# Patient Record
Sex: FEMALE | Race: WHITE | NOT HISPANIC OR LATINO | Employment: UNEMPLOYED | URBAN - METROPOLITAN AREA
[De-identification: names, ages, dates, MRNs, and addresses within clinical notes are randomized per-mention and may not be internally consistent; named-entity substitution may affect disease eponyms.]

---

## 2020-09-17 ENCOUNTER — HOSPITAL ENCOUNTER (OUTPATIENT)
Dept: URGENT CARE | Facility: CLINIC | Age: 45
Discharge: HOME | End: 2020-09-17
Attending: PHYSICIAN ASSISTANT

## 2020-09-19 ENCOUNTER — HOSPITAL ENCOUNTER (OUTPATIENT)
Dept: URGENT CARE | Facility: CLINIC | Age: 45
Discharge: HOME | End: 2020-09-19
Attending: EMERGENCY MEDICINE

## 2020-10-28 ENCOUNTER — HOSPITAL ENCOUNTER (OUTPATIENT)
Dept: FAMILY MEDICINE CLINIC | Facility: CLINIC | Age: 45
Discharge: HOME | End: 2020-10-28
Attending: NURSE PRACTITIONER

## 2020-10-28 ENCOUNTER — CONVERSION ENCOUNTER (OUTPATIENT)
Dept: FAMILY MEDICINE CLINIC | Facility: CLINIC | Age: 45
End: 2020-10-28

## 2020-10-28 ENCOUNTER — OFFICE VISIT CONVERTED (OUTPATIENT)
Dept: FAMILY MEDICINE CLINIC | Facility: CLINIC | Age: 45
End: 2020-10-28
Attending: NURSE PRACTITIONER

## 2020-10-28 LAB
BASOPHILS # BLD AUTO: 0.01 10*3/UL (ref 0–0.2)
BASOPHILS NFR BLD AUTO: 0.1 % (ref 0–3)
CONV ABS IMM GRAN: 0.02 10*3/UL (ref 0–0.2)
CONV IMMATURE GRAN: 0.3 % (ref 0–1.8)
DEPRECATED RDW RBC AUTO: 43.7 FL (ref 36.4–46.3)
EOSINOPHIL # BLD AUTO: 0.12 10*3/UL (ref 0–0.7)
EOSINOPHIL # BLD AUTO: 1.6 % (ref 0–7)
ERYTHROCYTE [DISTWIDTH] IN BLOOD BY AUTOMATED COUNT: 12.2 % (ref 11.7–14.4)
HCT VFR BLD AUTO: 39.1 % (ref 37–47)
HGB BLD-MCNC: 13.1 G/DL (ref 12–16)
LYMPHOCYTES # BLD AUTO: 2.57 10*3/UL (ref 1–5)
LYMPHOCYTES NFR BLD AUTO: 33.6 % (ref 20–45)
MCH RBC QN AUTO: 32.5 PG (ref 27–31)
MCHC RBC AUTO-ENTMCNC: 33.5 G/DL (ref 33–37)
MCV RBC AUTO: 97 FL (ref 81–99)
MONOCYTES # BLD AUTO: 0.53 10*3/UL (ref 0.2–1.2)
MONOCYTES NFR BLD AUTO: 6.9 % (ref 3–10)
NEUTROPHILS # BLD AUTO: 4.39 10*3/UL (ref 2–8)
NEUTROPHILS NFR BLD AUTO: 57.5 % (ref 30–85)
NRBC CBCN: 0 % (ref 0–0.7)
PLATELET # BLD AUTO: 328 10*3/UL (ref 130–400)
PMV BLD AUTO: 10.9 FL (ref 9.4–12.3)
RBC # BLD AUTO: 4.03 10*6/UL (ref 4.2–5.4)
WBC # BLD AUTO: 7.64 10*3/UL (ref 4.8–10.8)

## 2020-10-29 LAB
ALBUMIN SERPL-MCNC: 4.4 G/DL (ref 3.5–5)
ALBUMIN/GLOB SERPL: 1.5 {RATIO} (ref 1.4–2.6)
ALP SERPL-CCNC: 79 U/L (ref 42–98)
ALT SERPL-CCNC: 17 U/L (ref 10–40)
ANION GAP SERPL CALC-SCNC: 16 MMOL/L (ref 8–19)
AST SERPL-CCNC: 18 U/L (ref 15–50)
BILIRUB SERPL-MCNC: 0.57 MG/DL (ref 0.2–1.3)
BUN SERPL-MCNC: 10 MG/DL (ref 5–25)
BUN/CREAT SERPL: 13 {RATIO} (ref 6–20)
CALCIUM SERPL-MCNC: 10.3 MG/DL (ref 8.7–10.4)
CHLORIDE SERPL-SCNC: 99 MMOL/L (ref 99–111)
CHOLEST SERPL-MCNC: 225 MG/DL (ref 107–200)
CHOLEST/HDLC SERPL: 5.1 {RATIO} (ref 3–6)
CONV CO2: 23 MMOL/L (ref 22–32)
CONV TOTAL PROTEIN: 7.3 G/DL (ref 6.3–8.2)
CREAT UR-MCNC: 0.77 MG/DL (ref 0.5–0.9)
GFR SERPLBLD BASED ON 1.73 SQ M-ARVRAT: >60 ML/MIN/{1.73_M2}
GLOBULIN UR ELPH-MCNC: 2.9 G/DL (ref 2–3.5)
GLUCOSE SERPL-MCNC: 97 MG/DL (ref 65–99)
HDLC SERPL-MCNC: 44 MG/DL (ref 40–60)
LDLC SERPL CALC-MCNC: 108 MG/DL (ref 70–100)
OSMOLALITY SERPL CALC.SUM OF ELEC: 277 MOSM/KG (ref 273–304)
POTASSIUM SERPL-SCNC: 4 MMOL/L (ref 3.5–5.3)
SODIUM SERPL-SCNC: 134 MMOL/L (ref 135–147)
TRIGL SERPL-MCNC: 364 MG/DL (ref 40–150)
TSH SERPL-ACNC: 3.25 M[IU]/L (ref 0.27–4.2)
VLDLC SERPL-MCNC: 73 MG/DL (ref 5–37)

## 2020-11-09 ENCOUNTER — HOSPITAL ENCOUNTER (OUTPATIENT)
Dept: GENERAL RADIOLOGY | Facility: HOSPITAL | Age: 45
Discharge: HOME | End: 2020-11-09
Attending: NURSE PRACTITIONER

## 2020-11-11 ENCOUNTER — OFFICE VISIT CONVERTED (OUTPATIENT)
Dept: FAMILY MEDICINE CLINIC | Facility: CLINIC | Age: 45
End: 2020-11-11
Attending: NURSE PRACTITIONER

## 2020-12-07 ENCOUNTER — HOSPITAL ENCOUNTER (OUTPATIENT)
Dept: MAMMOGRAPHY | Facility: HOSPITAL | Age: 45
Discharge: HOME | End: 2020-12-07
Attending: NURSE PRACTITIONER

## 2021-04-14 ENCOUNTER — OFFICE VISIT CONVERTED (OUTPATIENT)
Dept: FAMILY MEDICINE CLINIC | Facility: CLINIC | Age: 46
End: 2021-04-14
Attending: NURSE PRACTITIONER

## 2021-04-28 ENCOUNTER — OFFICE VISIT CONVERTED (OUTPATIENT)
Dept: SURGERY | Facility: CLINIC | Age: 46
End: 2021-04-28
Attending: SURGERY

## 2021-04-28 ENCOUNTER — CONVERSION ENCOUNTER (OUTPATIENT)
Dept: SURGERY | Facility: CLINIC | Age: 46
End: 2021-04-28

## 2021-05-10 NOTE — H&P
"   History and Physical      Patient Name: Ada Schuster   Patient ID: [id number]   Sex: Female   YOB: 1975    Primary Care Provider: Annette BERMAN   Referring Provider: [facility]    Visit Date: October 28, 2020    Provider: DOROTHY Mills   Location: [facility]   Location Address: [address] [place] [address], [address] [place] [address]  [id number]   Location Phone: [phone]          Chief Complaint  · new patient  · sores on right breast with pus      History Of Present Illness  Ada Schuster is a 45 year old /White female who presents for evaluation and treatment of:      She is here today as a new patient to establish care.    She is here today for trapezius injury.  She states that on 9/19/2020 she felt a \"crack\" on her posterior left shoulder while she was stretching.  She has been having pain ever since.  She did go to urgent care and then to the emergency room the next day in September.  She did have an x-ray done on her left shoulder which was negative.  She was given Toradol and a steroid injection in the emergency room.  She was also given Naprosyn and Flexeril which helped some.  She was given lidocaine patches when she went to [facility] but states they did not help.  She was told she could be referred to Ortho but when Ortho was called she was told they did not take care of trapezius injury and that she would need to go to the neurosurgeon.  She is not wanting a referral to the neurosurgeon at this time.  She does complain of some neck pain and she states the pain does radiate down her left arm.    She would also like to discuss a possible breast infection.  She is complaining of sores on her right breast that fill up with pus Intermittently for the past year.  She states she just cleans it with soap and water.  She has not had a mammogram since 2011.  Denies any infection at this time.    Obesity: She has a history had a lap band surgery in " .  She has previous history of gestational diabetes, hypertension, hyperlipidemia prior to her weight loss surgery.       Past Medical History  Disease Name Date Onset Notes   Allergies --  --    Anemia --  --    Anxiety --  --    Cataract --  --    Depression --  --    Diabetes --  --    Gall stones --  --    Hyperlipidemia --  --    Hypertension --  --    Kidney stones --  --    Migraines --  --          Past Surgical History  Procedure Name Date Notes   Appendectomy  --     section --  --    Cholecystectomy  --    EAR Surgery --  - inner ear reconstruction   Ear Tubes --  --    Lap Band Surgery  --          Medication List  Name Date Started Instructions   acetaminophen 500 mg oral tablet  take 2 tablets (1,000 mg) by oral route every 6 hours as needed   ibuprofen 400 mg oral tablet  take 1 tablet (400 mg) by oral route 3 times per day with food   melatonin 10 mg oral tablet  take 1 tablet by oral route daily         Allergy List  Allergen Name Date Reaction Notes   NO KNOWN DRUG ALLERGIES --  --  --          Family Medical History  Disease Name Relative/Age Notes   Heart Disease  grandparent   Diabetes, unspecified type Father/  Grandmother (paternal)/   Father; Grandmother (paternal); Aunt (paternal); Uncle (paternal)         Social History  Finding Status Start/Stop Quantity Notes   Alcohol Current some day 0/0 --  drinks rarely   Caffeine Current some day 0/0 --  drinks occasionally; tea; 1-2 times per day   Second hand smoke exposure Never 0/0 --  no   Tobacco Current every day 18/0 1/2 PPD current some days smoker; started smoking at age 16; smoked 5 cigarette(s) per day         Review of Systems  · Constitutional  o Denies  o : fever, fatigue, weight loss, weight gain  · Breasts  o Admits  o : abnormal changes in breast size  o Denies  o : lumps, tenderness  · Cardiovascular  o Denies  o : lower extremity edema, claudication, chest pressure,  "palpitations  · Respiratory  o Denies  o : shortness of breath, wheezing, cough, hemoptysis, dyspnea on exertion  · Gastrointestinal  o Denies  o : nausea, vomiting, diarrhea, constipation, abdominal pain  · Genitourinary  o Denies  o : urgency, frequency  · Integument  o Admits  o : rash, itching, new skin lesions  · Neurologic  o Admits  o : difficulty concentrating  o Denies  o : altered mental status, tingling or numbness  · Musculoskeletal  o Admits  o : muscle pain, neck pain, shoulder pain  o Denies  o : limitation of motion  · Psychiatric  o Admits  o : difficulty sleeping  o Denies  o : suicidal ideation, homicidal ideation      Vitals  Date Time BP Position Site L\R Cuff Size HR RR TEMP (F) WT  HT  BMI kg/m2 BSA m2 O2 Sat FR L/min FiO2        10/28/2020 02:02 /70 Sitting    90 - R  97.1 262lbs 8oz 5'  4\" 45.06 2.32 94 %            Physical Examination  · Constitutional  o Appearance  o : well-nourished, in no acute distress  · Neck  o Inspection/Palpation  o : normal appearance, no masses or tenderness, trachea midline  o Range of Motion  o : cervical range of motion within normal limits  o Thyroid  o : gland size normal, nontender, no nodules or masses present on palpation  · Respiratory  o Respiratory Effort  o : breathing unlabored  o Inspection of Chest  o : normal appearance  o Auscultation of Lungs  o : normal breath sounds throughout  · Cardiovascular  o Heart  o :   § Auscultation of Heart  § : regular rate and rhythm, no murmurs, gallops or rubs  o Peripheral Vascular System  o :   § Extremities  § : no edema  · Breasts  o Inspection of Breasts  o : developmental state normal for age, breasts symmetrical, skin lesion present-right 8 o'clock, no discharge present, no deformities present  o Palpation of Breasts, Axillae  o : no masses present on palpation, no breast tenderness  · Gastrointestinal  o Abdominal Examination  o : abdomen nontender to palpation, tone normal without rigidity or " guarding, no masses present, normal bowel sounds  · Lymphatic  o Neck  o : no lymphadenopathy present  o Axilla  o : no lymphadenopathy present  · Skin and Subcutaneous Tissue  o General Inspection  o : maculopapular eruption present under right breast  · Psychiatric  o Judgement and Insight  o : judgment and insight intact  o Mood and Affect  o : mood normal, affect appropriate  · Cervical Spine  o Inspection  o : no redness, no swelling, no atrophy, no deformity, no mass  o Palpation  o : no tenderness  · Left Shoulder  o Inspection  o : no redness, no scarring, no swelling, no atrophy, no abnormalities, no ecchymosis  o Palpation  o : tenderness to palpation trapezius muscle with muscle spasm          Assessment  · Visit for screening mammogram     V76.12/Z12.31  · Cervical pain (neck)     723.1/M54.2  we will get C-spine xray  · Obesity     278.00/E66.9  · Trapezius muscle strain, left, initial encounter     840.8/S46.812A  will restart Naprosyn and Flexeril. Recommended therapeutic massage.   · Skin lesion of breast, Right     611.9/N64.9  we will get diagnostic mammogram with US if needed of right breast due to non-healing sore  · History of hyperlipidemia     V12.29/Z86.39  · Radiculopathy of arm     723.4/M54.10  · Cutaneous candidiasis     112.3/B37.2  start nystatin powder 2x/day for 14 days      Plan  · Orders  o Screening Mammography; Bilateral 3D (30799, 65371, ) - V76.12/Z12.31 - 10/28/2020  o Cervical Spine Complete [facility] (63745) - 723.1/M54.2, 723.4/M54.10 - 10/28/2020  o CBC with Auto Diff [facility] (86445) - 611.9/N64.9, 278.00/E66.9, V12.29/Z86.39 - 10/28/2020  o CMP [facility] (05982) - 611.9/N64.9, 278.00/E66.9, V12.29/Z86.39 - 10/28/2020  o Lipid Panel [facility] (80654) - 278.00/E66.9, V12.29/Z86.39 - 10/28/2020  o TSH [facility] (27161) - 278.00/E66.9, V12.29/Z86.39 - 10/28/2020  o ACO-39: Current medications updated and reviewed (, 1159F) - - 10/28/2020  o ACO-18: Positive screen for clinical depression using  a standardized tool and a follow-up plan documented () - - 10/28/2020   9 pts.   PHQ-9 score 9, no suicidal ideation. We did not have time to discuss and will discuss on follow-up.  o ACO-14: Influenza immunization was not administered for reasons documented [facility] () - - 10/28/2020   patient declines  o Diagnostic Mammogram 3D breast unilateral RIGHT (w/US if needed) [facility] (, -YL) - 611.9/N64.9 - 10/28/2020  · Medications  o Naprosyn 500 mg oral tablet   SIG: take 1 tablet (500 mg) by oral route every 12 hours with food for 30 days   DISP: (60) Tablet with 0 refills  Prescribed on 10/28/2020     o cyclobenzaprine 10 mg oral tablet   SIG: take 1 tablet (10 mg) by oral route 3 times per day as needed for pain (Drowsiness warning)   DISP: (90) Tablet with 0 refills  Prescribed on 10/28/2020     o nystatin 100,000 unit/gram topical powder   SIG: apply to the affected area(s) by topical route 2 times per day for 14 days   DISP: (1) Bottle with 0 refills  Prescribed on 10/28/2020     o Medications have been Reconciled  o Transition of Care or Provider Policy  · Instructions  o Patient was educated/instructed on their diagnosis, treatment and medications prior to discharge from the clinic today.  o Patient instructed to seek medical attention urgently for new or worsening symptoms.  o Call the office with any concerns or questions.  · Disposition  o Return to clinic in 2 weeks            Electronically Signed by: DOROTHY Mills -Author on October 28, 2020 04:47:02 PM

## 2021-05-11 NOTE — H&P
History and Physical      Patient Name: Ada Schuster   Patient ID: [id number]   Sex: Female   YOB: 1975    Primary Care Provider: January DE LA CRUZ   Referring Provider: January DE LA CRUZ    Visit Date: 2021    Provider: Herber Huggins MD   Location: [facility]   Location Address: [address] [place] [address]  [id number]   Location Phone: [phone]          Chief Complaint  · Outpatient History & Physical / Surgical Orders  · Diverticulitis      History Of Present Illness  Ada Schuster is a 45 year old /White female who presents to the office today as a consult from January DE LA CRUZ.      Previously seen as an inpatient consult 2021 with sigmoid diverticulitis with CT abdomen and pelvis demonstrating 2 segments of acute diverticulitis 1 at the splenic flexure and one at the descending sigmoid junction with the segment at the descending sigmoid junction suggesting subtle microperforation, no abscess.  Admitted and started on antibiotics.  Improved and sent home on Cipro and Flagyl.    She comes in for follow-up.  No changes in health or medication.  She is trying to quit smoking.  She finished her antibiotics.  No fever.  No pain.  No blood in her stool.  No previous colonoscopy.  No history of diverticulitis in the past.  No family history of colorectal cancer.       Past Medical History  Allergies; Anemia; Anxiety; Cataract; Cervical pain (neck); Depression; Diabetes; Gall stones; History of hyperlipidemia; Hyperlipidemia; Hypertension; Kidney Stones; Migraines; Radiculopathy of arm; Skin lesion of breast, Right; Trapezius muscle strain         Past Surgical History  Appendectomy;  section; Cholecystectomy; EAR Surgery; Ear Tubes; Lap Band Surgery         Medication List  acetaminophen 500 mg oral tablet; ciprofloxacin HCl 500 mg oral tablet; cyclobenzaprine 10 mg oral tablet; Diflucan 150 mg oral tablet;  Flagyl 500 mg oral tablet; ibuprofen 400 mg oral tablet; melatonin 10 mg oral tablet; nystatin 100,000 unit/gram topical powder; Suprep Bowel Prep Kit 17.5-3.13-1.6 gram oral recon soln; Wellbutrin  mg oral tablet sustained-release 12 hr         Allergy List  NO KNOWN DRUG ALLERGIES       Allergies Reconciled  Family Medical History  Heart Disease; Diabetes, unspecified type         Social History  Alcohol (Current some day); Caffeine (Current some day); Second hand smoke exposure (Never); Tobacco (Current every day)         Immunizations  Name Date Admin   Influenza Refused         Review of Systems  · Constitutional  o Denies  o : chills, excessive sweating, fatigue, fever, sycope/passing out, weight gain, weight loss  · Eyes  o Denies  o : changes in vision, blurry vision, double vision  · HENT  o Denies  o : loss of hearing, ringing in the ears, ear aches, sore throat, nasal congestion, sinus pain, nose bleeds, seasonal allergies  · Cardiovascular  o Denies  o : blood clots, swollen legs, anemia, easy burising or bleeding, transfusions  · Respiratory  o Denies  o : shortness of breath, dry cough, productive cough, pneumonia, COPD  · Gastrointestinal  o Denies  o : difficulty swallowing, reflux  · Genitourinary  o Denies  o : incontinence  · Neurologic  o Denies  o : headache, seizure, stroke, tremor, loss of balance, falls, dizziness/vertigo, difficulty with sleep, numbness/tingling/paresthesia , difficulty with coordination, difficulty with dexterity, weakness  · Musculoskeletal  o Denies  o : neck stiffness/pain, swollen lymph nodes, muscle aches, joint pain, weakness, spasms, sciatica, pain radiating in arm, pain radiating in leg, low back pain  · Endocrine  o Denies  o : diabetes, thyroid disorder  · Psychiatric  o Denies  o : anxiety, depression      Vitals  Date Time BP Position Site L\R Cuff Size HR RR TEMP (F) WT  HT  BMI kg/m2 BSA m2 O2 Sat FR L/min FiO2        04/28/2021 01:01 PM       14   "258lbs 0oz 5'  4\" 44.29 2.3             Physical Examination  · Constitutional  o Appearance  o : healthy appearing, alert and in no acute distress, reliable historian  · Head and Face  o Head  o :   § Inspection  § : no visable deformities or lesions  · Eyes  o Conjunctivae  o : clear  o Sclerae  o : clear  · Neck  o Inspection/Palpation  o : normal appearance, no masses, trachea midline  · Respiratory  o Respiratory Effort  o : breathing unlabored, respiratory effort appears normal  o Inspection of Chest  o : normal appearance, no retractions  · Cardiovascular  o Heart  o : regular rate and rhythm  · Gastrointestinal  o Abdominal Examination  o :   § Abdomen  § : soft, nondistended, positive bowel sounds, no hepatosplenomegaly, soft, nontender, no mass  · Skin and Subcutaneous Tissue  o General Inspection  o : no visible concerning rashes or lesions present  · Neurologic  o Cranial Nerves  o : no obvious motor deficits  o Sensation  o : no obvious sensory deficits  o Gait and Station  o :   § Gait Screening  § : normal gait, able to stand without diffculty  o Cerebellar Function  o : no obvious abnormalities  · Psychiatric  o Judgement and Insight  o : judgment and insight intact  o Mood and Affect  o : mood normal, affect appropriate          Assessment  · Pre-Surgical Orders     V72.84  · Diverticulitis     562.11/K57.92      Plan  · Orders  o Colonoscopy (34927) - V72.84 - 05/20/2021  o Tobacco cessation counseling completed (4004F) - - 04/28/2021  · Medications  o Medications have been Reconciled  o Transition of Care or Provider Policy  · Instructions  o PLAN: Colonoscopy  o Outpatient  o Consent for surgery: Given these options, the patient has verbally expressed an understanding of the risks of surgery and finds these risks acceptable. We will proceed with surgery as soon as possible.  o The indications, options, risks, benefits, and expected outcomes of the planned procedure were discussed with the patient " and the patient agrees to proceed.   o IV: LR@ 30 ml/hr  o Anesthesia: MAC  o PLEASE SIGN PERMIT FOR: Colonscopy with possible biopsy and possible polypectomy  o Beta HCG urine to be done the morning of surgery.  o Electronically Identified Patient Education Materials Provided Electronically     I had a discussion with the patient.  I recommend a colonoscopy in 2 weeks to evaluate her colon and help rule out a cancer.  Benefits and alternatives discussed.  Risk of procedure including bleeding and perforation discussed.  All questions answered.  She agrees with the plan.  Smoking cessation counseling performed.  I encouraged her to increase the fiber in her diet.  Based on the colonoscopy, further recommendations to follow regarding possible elective resection.  All questions answered.  She agrees with the plan.  Thank you for the consult.             Electronically Signed by: Herber Huggins MD -Author on April 28, 2021 02:27:23 PM

## 2021-05-13 NOTE — PROGRESS NOTES
Progress Note      Patient Name: Ada Schuster   Patient ID: [id number]   Sex: Female   YOB: 1975    Primary Care Provider: January DE LA CRUZ   Referring Provider: [facility]    Visit Date: 2020    Provider: DOROTHY Mills   Location: [facility]   Location Address: [address] [place] [address], [address] [place] [address]  [id number]   Location Phone: [phone]          Chief Complaint  · 2-week follow-up      History Of Present Illness  Ada Schuster is a 45 year old /White female who presents for evaluation and treatment of:      She is here for 2-week follow-up on trapezius muscle strain on the left with neck pain and left arm radiculopathy.  She was given Naprosyn 500 mg twice daily and cyclobenzaprine 10 mg 3 times daily as needed.  Cervical spine x-ray showed mild degenerative changes, most pronounced at C5-C6 and C6-C7, no foraminal stenosis.  She states that her pain is improving and more tolerable now.  She declines to do physical therapy at this time.    She is scheduled for her mammogram.  She is due for Pap smear.    She is a smoker, smokes a half a pack per day.  She is not interested in quitting at this time.       Past Medical History  Disease Name Date Onset Notes   Allergies --  --    Anemia --  --    Anxiety --  --    Cataract --  --    Cervical pain (neck) 10/28/2020 --    Depression --  --    Diabetes --  --    Gall stones --  --    History of hyperlipidemia 10/28/2020 --    Hyperlipidemia --  --    Hypertension --  --    Kidney stones --  --    Migraines --  --    Radiculopathy of arm 10/28/2020 --    Skin lesion of breast, Right 10/28/2020 --          Past Surgical History  Procedure Name Date Notes   Appendectomy  --     section --  --    Cholecystectomy  --    EAR Surgery --  - inner ear reconstruction   Ear Tubes --  --    Lap Band Surgery  --          Medication List  Name Date Started  Instructions   acetaminophen 500 mg oral tablet  take 2 tablets (1,000 mg) by oral route every 6 hours as needed   cyclobenzaprine 10 mg oral tablet 10/28/2020 take 1 tablet (10 mg) by oral route 3 times per day as needed for pain (Drowsiness warning)   ibuprofen 400 mg oral tablet  take 1 tablet (400 mg) by oral route 3 times per day with food   melatonin 10 mg oral tablet  take 1 tablet by oral route daily   Naprosyn 500 mg oral tablet 10/28/2020 take 1 tablet (500 mg) by oral route every 12 hours with food for 30 days   nystatin 100,000 unit/gram topical powder 10/28/2020 apply to the affected area(s) by topical route 2 times per day for 14 days         Allergy List  Allergen Name Date Reaction Notes   NO KNOWN DRUG ALLERGIES --  --  --          Family Medical History  Disease Name Relative/Age Notes   Heart Disease  grandparent   Diabetes, unspecified type Father/  Grandmother (paternal)/   Father; Grandmother (paternal); Aunt (paternal); Uncle (paternal)         Social History  Finding Status Start/Stop Quantity Notes   Alcohol Current some day 0/0 --  drinks rarely   Caffeine Current some day 0/0 --  drinks occasionally; tea; 1-2 times per day   Second hand smoke exposure Never 0/0 --  no   Tobacco Current every day 18/0 1/2 PPD current some days smoker; started smoking at age 16; smoked 5 cigarette(s) per day         Immunizations  NameDate Admin Mfg Trade Name Lot Number Route Inj VIS Given VIS Publication   InfluenzaRefused 10/28/2020 NE Not Entered  NE NE     Comments:          Review of Systems  · Constitutional  o Denies  o : fever, fatigue, weight loss, weight gain  · Cardiovascular  o Denies  o : lower extremity edema, claudication, chest pressure, palpitations  · Respiratory  o Denies  o : shortness of breath, wheezing, cough, hemoptysis, dyspnea on exertion  · Gastrointestinal  o Denies  o : nausea, vomiting, diarrhea, constipation, abdominal pain  · Genitourinary  o Denies  o : urgency,  "frequency  · Integument  o Denies  o : rash, itching  · Neurologic  o Admits  o : tingling or numbness  o Denies  o : altered mental status  · Musculoskeletal  o Admits  o : shoulder pain  o Denies  o : neck pain      Vitals  Date Time BP Position Site L\R Cuff Size HR RR TEMP (F) WT  HT  BMI kg/m2 BSA m2 O2 Sat FR L/min FiO2 HC       11/11/2020 12:57 /72 Sitting    96 - R  98 264lbs 0oz 5'  4\" 45.32 2.33 95 %            Physical Examination  · Constitutional  o Appearance  o : no acute distress, well-nourished  · Head and Face  o Head  o :   § Inspection  § : atraumatic, normocephalic  · Neck  o Thyroid  o : gland size normal, nontender, no nodules or masses present on palpation, symmetric  · Respiratory  o Respiratory Effort  o : breathing comfortably, symmetric chest rise  o Auscultation of Lungs  o : clear to asculatation bilaterally, no wheezes, rales, or rhonchii  · Cardiovascular  o Heart  o :   § Auscultation of Heart  § : regular rate and rhythm, no murmurs, rubs, or gallops  o Peripheral Vascular System  o :   § Extremities  § : no edema  · Lymphatic  o Neck  o : no lymphadenopathy present  · Neurologic  o Mental Status Examination  o :   § Orientation  § : grossly oriented to person, place and time  o Gait and Station  o :   § Gait Screening  § : normal gait  · Psychiatric  o General  o : normal mood and affect  · Cervical Spine  o Inspection  o : no redness, no swelling, no atrophy, no deformity, no mass  o Palpation  o : no tenderness  · Left Shoulder  o Inspection  o : no redness, no scarring, no swelling, no atrophy, no abnormalities, no ecchymosis  o Palpation  o : Posterior shoulder trapezius muscle tender on palpation          Assessment  · Radiculopathy of arm     723.4/M54.10  · Trapezius muscle strain     840.8/S46.819A  Pain is improving, continue meds as listed.      Plan  · Orders  o ACO-39: Current medications updated and reviewed (, 2185U) - - " 11/11/2020  · Medications  o Medications have been Reconciled  o Transition of Care or Provider Policy  · Instructions  o Patient was educated/instructed on their diagnosis, treatment and medications prior to discharge from the clinic today.  o Patient counseled to stop smoking.  o Patient instructed to seek medical attention urgently for new or worsening symptoms.  o Call the office with any concerns or questions.  · Disposition  o Schedule Pap smear            Electronically Signed by: DOROTHY Mills -Author on November 11, 2020 01:09:47 PM

## 2021-05-14 VITALS
TEMPERATURE: 97.1 F | DIASTOLIC BLOOD PRESSURE: 70 MMHG | HEIGHT: 64 IN | OXYGEN SATURATION: 94 % | WEIGHT: 262.5 LBS | SYSTOLIC BLOOD PRESSURE: 114 MMHG | BODY MASS INDEX: 44.82 KG/M2 | HEART RATE: 90 BPM

## 2021-05-14 VITALS
BODY MASS INDEX: 44.24 KG/M2 | TEMPERATURE: 97.6 F | OXYGEN SATURATION: 96 % | HEIGHT: 64 IN | SYSTOLIC BLOOD PRESSURE: 112 MMHG | DIASTOLIC BLOOD PRESSURE: 82 MMHG | WEIGHT: 259.12 LBS | HEART RATE: 94 BPM

## 2021-05-14 VITALS
SYSTOLIC BLOOD PRESSURE: 110 MMHG | HEIGHT: 64 IN | DIASTOLIC BLOOD PRESSURE: 72 MMHG | TEMPERATURE: 98 F | OXYGEN SATURATION: 95 % | HEART RATE: 96 BPM | WEIGHT: 264 LBS | BODY MASS INDEX: 45.07 KG/M2

## 2021-05-14 VITALS — HEIGHT: 64 IN | RESPIRATION RATE: 14 BRPM | WEIGHT: 258 LBS | BODY MASS INDEX: 44.05 KG/M2

## 2021-05-14 NOTE — PROGRESS NOTES
"   Progress Note      Patient Name: Ada Schuster   Patient ID: [id number]   Sex: Female   YOB: 1975    Primary Care Provider: January DE LA CRUZ   Referring Provider: January DE LA CRUZ    Visit Date: 2021    Provider: DOROTHY Mills   Location: [facility]   Location Address: [address] [place] [address], [address] [place] [address]  [id number]   Location Phone: [phone]          Chief Complaint  · inpatient follow up for diverticulitis      History Of Present Illness  Ada Schuster is a 45 year old /White female who presents for evaluation and treatment of:      She is here for follow-up from the hospital.  She was admitted on 2021 for acute diverticulitis.  She states she is doing much better now and denies any pain.  She is still taking Cipro and Flagyl as prescribed.  She does have a follow-up with gastroenterologist Dr. Huggins.  She states that he is planning to do a colonoscopy.    She does think she has a vaginal yeast infection due to the antibiotics.  She is starting to have vaginal itching.    She is wanting to quit smoking.  She currently smokes about half a pack per day.  She states she has tried quitting in the past by doing \"cold turkey\" method.  We discussed different options and she would like to try Wellbutrin.       Past Medical History  Disease Name Date Onset Notes   Allergies --  --    Anemia --  --    Anxiety --  --    Cataract --  --    Cervical pain (neck) 10/28/2020 --    Depression --  --    Diabetes --  --    Gall stones --  --    History of hyperlipidemia 10/28/2020 --    Hyperlipidemia --  --    Hypertension --  --    Kidney Stones --  --    Migraines --  --    Radiculopathy of arm 10/28/2020 --    Skin lesion of breast, Right 10/28/2020 --    Trapezius muscle strain 2020 --          Past Surgical History  Procedure Name Date Notes   Appendectomy  --     section --  --    Cholecystectomy 2006 " --    EAR Surgery --  2013- inner ear reconstruction   Ear Tubes --  --    Lap Band Surgery 2009 --          Medication List  Name Date Started Instructions   acetaminophen 500 mg oral tablet  take 2 tablets (1,000 mg) by oral route every 6 hours as needed   ciprofloxacin HCl 500 mg oral tablet  take 1 tablet (500 mg) by oral route 2 times per day for 10 days   cyclobenzaprine 10 mg oral tablet 10/28/2020 take 1 tablet (10 mg) by oral route 3 times per day as needed for pain (Drowsiness warning)   Flagyl 500 mg oral tablet  take 1 tablet (500 mg) by oral route 3 times per day for 10 days   ibuprofen 400 mg oral tablet  take 1 tablet (400 mg) by oral route 3 times per day with food   melatonin 10 mg oral tablet  take 1 tablet by oral route daily   nystatin 100,000 unit/gram topical powder 10/28/2020 apply to the affected area(s) by topical route 2 times per day for 14 days         Allergy List  Allergen Name Date Reaction Notes   NO KNOWN DRUG ALLERGIES --  --  --        Allergies Reconciled  Family Medical History  Disease Name Relative/Age Notes   Heart Disease  grandparent   Diabetes, unspecified type Father/  Grandmother (paternal)/   Father; Grandmother (paternal); Aunt (paternal); Uncle (paternal)         Social History  Finding Status Start/Stop Quantity Notes   Alcohol Current some day 0/0 --  drinks rarely   Caffeine Current some day 0/0 --  drinks occasionally; tea; 1-2 times per day   Second hand smoke exposure Never 0/0 --  no   Tobacco Current every day 18/0 1/2 PPD current some days smoker; started smoking at age 16; smoked 5 cigarette(s) per day         Immunizations  NameDate Admin Mfg Trade Name Lot Number Route Inj VIS Given VIS Publication   InfluenzaRefused 10/28/2020 NE Not Entered  NE NE     Comments:          Review of Systems  · Constitutional  o Denies  o : fever, fatigue, weight loss, weight gain  · Cardiovascular  o Denies  o : lower extremity edema, claudication, chest pressure,  "palpitations  · Respiratory  o Denies  o : shortness of breath, wheezing, cough, hemoptysis, dyspnea on exertion  · Gastrointestinal  o Denies  o : nausea, vomiting, diarrhea, constipation, abdominal pain  · Genitourinary  o Denies  o : urgency, frequency, dysuria, vaginal discharge  · Integument  o Denies  o : rash, itching  · Psychiatric  o Denies  o : anxiety, depression, suicidal ideation, homicidal ideation      Vitals  Date Time BP Position Site L\R Cuff Size HR RR TEMP (F) WT  HT  BMI kg/m2 BSA m2 O2 Sat FR L/min FiO2 HC       04/14/2021 02:28 /82 Sitting    94 - R  97.6 259lbs 2oz 5'  4\" 44.48 2.3 96 %            Physical Examination  · Constitutional  o Appearance  o : no acute distress, well-nourished  · Head and Face  o Head  o :   § Inspection  § : atraumatic, normocephalic  · Respiratory  o Respiratory Effort  o : breathing comfortably, symmetric chest rise  o Auscultation of Lungs  o : clear to asculatation bilaterally, no wheezes, rales, or rhonchii  · Cardiovascular  o Heart  o :   § Auscultation of Heart  § : regular rate and rhythm, no murmurs, rubs, or gallops  o Peripheral Vascular System  o :   § Extremities  § : no edema  · Gastrointestinal  o Abdominal Examination  o :   § Abdomen  § : bowel sounds present, non-distended, non-tender  · Neurologic  o Mental Status Examination  o :   § Orientation  § : grossly oriented to person, place and time  o Gait and Station  o :   § Gait Screening  § : normal gait  · Psychiatric  o General  o : normal mood and affect  o Presence of Abnormal Thoughts  o : no homicidal ideation, no suicidal ideation          Assessment  · Cigarette nicotine dependence without complication     305.1/F17.210  We discussed multiple options for smoking cessation and she would like to try Wellbutrin. I will start her on Wellbutrin  mg 1 tablet daily for 1 week then increase to twice daily.  · Diverticulitis     562.11/K57.92  Stable, instructed to finish/complete all " of antibiotics.  · Vaginal candidiasis     112.1/B37.3  I will treat her with Diflucan 150 mg 1 tablet today and repeat 1 dose in 3 days. Advised to call if not improving.      Plan  · Orders  o ACO-17: Screened for tobacco use AND received tobacco cessation intervention (4004F) - - 04/14/2021  o ACO-17: Screened for tobacco use AND received tobacco cessation intervention (4004F) - 305.1/F17.210 - 04/14/2021  o ACO-39: Current medications updated and reviewed (, 1159F) - - 04/14/2021  · Medications  o Diflucan 150 mg oral tablet   SIG: take 1 tablet (150 mg) by oral route once today, then repeat x 1 dose in 72 hours   DISP: (2) Tablet with 0 refills  Prescribed on 04/14/2021     o Wellbutrin  mg oral tablet sustained-release 12 hr   SIG: take 1 tablet (150 mg) by oral route 2 times per day for 30 days   DISP: (60) Tablet with 5 refills  Prescribed on 04/14/2021     o Medications have been Reconciled  o Transition of Care or Provider Policy  · Instructions  o *Form of nicotine being used: Cigarettes  o Patient was strongly encouraged to discontinue use of any nicotine containing product or minimize the use of the product.  o Discussed smoking cessation and counseling with patient for over 3 minutes.  o Patient was educated/instructed on their diagnosis, treatment and medications prior to discharge from the clinic today.  o Patient instructed to seek medical attention urgently for new or worsening symptoms.  o Call the office with any concerns or questions.  · Disposition  o Return to clinic in 3 months            Electronically Signed by: DOROTHY Mills -Author on April 14, 2021 03:02:20 PM

## 2021-05-20 ENCOUNTER — HOSPITAL ENCOUNTER (OUTPATIENT)
Dept: GASTROENTEROLOGY | Facility: HOSPITAL | Age: 46
Setting detail: HOSPITAL OUTPATIENT SURGERY
Discharge: HOME | End: 2021-05-20
Attending: SURGERY

## 2021-06-16 RX ORDER — FLUCONAZOLE 150 MG/1
TABLET ORAL
COMMUNITY
Start: 2021-04-14 | End: 2021-07-28

## 2021-06-16 RX ORDER — PHENOL 1.4 %
AEROSOL, SPRAY (ML) MUCOUS MEMBRANE
COMMUNITY

## 2021-06-16 RX ORDER — IBUPROFEN 400 MG/1
TABLET ORAL
COMMUNITY

## 2021-06-16 RX ORDER — CIPROFLOXACIN 500 MG/1
TABLET, FILM COATED ORAL
COMMUNITY
End: 2021-07-28

## 2021-06-16 RX ORDER — METRONIDAZOLE 500 MG/1
TABLET ORAL
COMMUNITY
End: 2021-07-28

## 2021-06-16 RX ORDER — ACETAMINOPHEN 500 MG
TABLET ORAL
COMMUNITY

## 2021-06-16 RX ORDER — SODIUM, POTASSIUM,MAG SULFATES 17.5-3.13G
SOLUTION, RECONSTITUTED, ORAL ORAL
COMMUNITY
Start: 2021-04-28 | End: 2021-07-28

## 2021-06-17 ENCOUNTER — OFFICE VISIT (OUTPATIENT)
Dept: SURGERY | Facility: CLINIC | Age: 46
End: 2021-06-17

## 2021-06-17 VITALS — RESPIRATION RATE: 16 BRPM | HEIGHT: 64 IN | WEIGHT: 256.6 LBS | BODY MASS INDEX: 43.81 KG/M2

## 2021-06-17 DIAGNOSIS — K57.92 DIVERTICULITIS: Primary | ICD-10-CM

## 2021-06-17 DIAGNOSIS — Z72.0 NICOTINE ABUSE: ICD-10-CM

## 2021-06-17 PROCEDURE — 99406 BEHAV CHNG SMOKING 3-10 MIN: CPT | Performed by: SURGERY

## 2021-06-17 PROCEDURE — 99212 OFFICE O/P EST SF 10 MIN: CPT | Performed by: SURGERY

## 2021-06-17 RX ORDER — BUPROPION HYDROCHLORIDE 150 MG/1
TABLET, EXTENDED RELEASE ORAL
COMMUNITY
Start: 2021-05-18 | End: 2021-07-28 | Stop reason: SDUPTHER

## 2021-06-17 RX ORDER — CALCIUM POLYCARBOPHIL 625 MG
TABLET ORAL DAILY
COMMUNITY
End: 2021-07-28

## 2021-06-17 RX ORDER — MULTIPLE VITAMINS W/ MINERALS TAB 9MG-400MCG
1 TAB ORAL DAILY
COMMUNITY

## 2021-06-18 NOTE — PROGRESS NOTES
General Surgery/Colorectal Surgery Note    Patient Name:  Ada Schuster  YOB: 1975  [id number]    Referring Provider: No ref. provider found      Patient Care Team:  January Short APRN as PCP - General (Nurse Practitioner)    Chief complaint follow-up after endoscopy    Subjective .     History of present illness:    Previously seen as an inpatient consult 4/7/2021 with sigmoid diverticulitis with CT abdomen and pelvis demonstrating 2 segments of acute diverticulitis 1 at the splenic flexure and one at the descending sigmoid junction with the segment at the descending sigmoid junction suggesting subtle microperforation, no abscess.  Admitted and started on antibiotics.  Improved and sent home on Cipro and Flagyl.    Status post colonoscopy with pandiverticulosis 5/20/2021    She comes in for follow-up.  No abdominal pain.  No changes in health or medications.     History:  History reviewed. No pertinent past medical history.    No past surgical history on file.    History reviewed. No pertinent family history.    Social History     Tobacco Use   • Smoking status: Current Every Day Smoker   • Smokeless tobacco: Never Used   Substance Use Topics   • Alcohol use: Not on file   • Drug use: Not on file       Review of Systems  All systems were reviewed and negative except for:   Review of Systems   Constitutional: Negative for chills, fever and unexpected weight loss.   HENT: Negative for congestion, nosebleeds and voice change.    Eyes: Negative for blurred vision, double vision and discharge.   Respiratory: Negative for apnea, chest tightness and shortness of breath.    Cardiovascular: Negative for chest pain and leg swelling.   Gastrointestinal:        See HPI   Endocrine: Negative for cold intolerance and heat intolerance.   Genitourinary: Negative for dysuria, hematuria and urgency.   Musculoskeletal: Negative for back pain, joint swelling and neck pain.   Skin: Negative for color change  and dry skin.   Neurological: Negative for dizziness and confusion.   Hematological: Negative for adenopathy.   Psychiatric/Behavioral: Negative for agitation and behavioral problems.     MEDS:  Prior to Admission medications    Medication Sig Start Date End Date Taking? Authorizing Provider   acetaminophen (TYLENOL) 500 MG tablet acetaminophen 500 mg oral tablet take 2 tablets (1,000 mg) by oral route every 6 hours as needed   Active   Yes María Pagan MD   buPROPion SR (WELLBUTRIN SR) 150 MG 12 hr tablet take 1 tablet (150 mg) by oral route 2 times per day for 30 days 5/18/21  Yes María Pagan MD   ibuprofen (ADVIL,MOTRIN) 400 MG tablet ibuprofen 400 mg oral tablet take 1 tablet (400 mg) by oral route 3 times per day with food   Active   Yes María Pagan MD   Melatonin 10 MG tablet melatonin 10 mg oral tablet take 1 tablet by oral route daily   Active   Yes María Pagan MD   multivitamin with minerals tablet tablet Take 1 tablet by mouth Daily.   Yes María Pagan MD   polycarbophil (calcium polycarbophil) 625 MG tablet tablet Take  by mouth Daily.   Yes María Pagan MD   ciprofloxacin (CIPRO) 500 MG tablet ciprofloxacin HCl 500 mg oral tablet take 1 tablet (500 mg) by oral route 2 times per day for 10 days   Active    María Pagan MD   fluconazole (DIFLUCAN) 150 MG tablet TAKE ONE TABLET BY MOUTH TODAY AND REPEAT IN 72 HOURS 4/14/21   María Pagan MD   metroNIDAZOLE (Flagyl) 500 MG tablet Flagyl 500 mg oral tablet take 1 tablet (500 mg) by oral route 3 times per day for 10 days   Active    María Pagan MD   Suprep Bowel Prep Kit 17.5-3.13-1.6 GM/177ML solution oral solution  4/28/21   María Pagan MD        Allergies:  Patient has no known allergies.    Objective     Vital Signs   Resp:  [16] 16    Physical Exam:     General Appearance:    Alert, cooperative, in no acute distress   Head:    Normocephalic, without obvious  "abnormality, atraumatic   Eyes:          Conjunctivae and sclerae normal, no icterus,     Ears:    Ears appear intact with no abnormalities noted   Throat:   No oral lesions, no thrush, oral mucosa moist   Neck:   No adenopathy, supple, trachea midline, no thyromegaly   Back:     No kyphosis present, no scoliosis present, no skin lesions,      erythema or scars, no tenderness to percussion or                   palpation,   range of motion normal   Lungs:     Clear to auscultation,respirations regular, even and                  unlabored    Heart:    Regular rhythm and normal rate, normal S1 and S2, no            murmur, no gallop, no rub, no click   Chest Wall:    No abnormalities observed   Abdomen:     Normal bowel sounds, no masses, no organomegaly, soft        non-tender, non-distended, no guarding, no rebound                tenderness   Rectal:        Extremities:   Moves all extremities well, no edema, no cyanosis, no             redness   Pulses:   Pulses palpable and equal bilaterally   Skin:   No bleeding, bruising or rash   Lymph nodes:   No palpable adenopathy   Neurologic:   A/o x 4 with no deficits       Results Review:   {Results Review:75177::\"I reviewed the patient's new clinical results.\"    LABS/IMAGING:  No results found for this or any previous visit.     Result Review :     Assessment/Plan     History of diverticulitis  Status post colonoscopy with pandiverticulosis 5/20/2021    I reviewed the findings of the colonoscopy with the patient.  With her having no pain, I do not recommend resection at this time.  Increase fiber in her diet.  Smoking cessation counseling performed.  Next colonoscopy in 10 years.      Ada Schuster  reports that she has been smoking. She has never used smokeless tobacco.. I have educated her on the risk of diseases from using tobacco products such as negative impact smoking has on her health  I advised her to quit   I spent 3  minutes counseling the patient.        "     Herber Huggins MD  06/18/21 10:04 EDT

## 2021-07-27 RX ORDER — CYCLOBENZAPRINE HCL 10 MG
10 TABLET ORAL 3 TIMES DAILY PRN
COMMUNITY
End: 2021-07-28

## 2021-07-28 ENCOUNTER — OFFICE VISIT (OUTPATIENT)
Dept: FAMILY MEDICINE CLINIC | Facility: CLINIC | Age: 46
End: 2021-07-28

## 2021-07-28 VITALS
OXYGEN SATURATION: 96 % | TEMPERATURE: 98.2 F | HEIGHT: 64 IN | SYSTOLIC BLOOD PRESSURE: 126 MMHG | DIASTOLIC BLOOD PRESSURE: 86 MMHG | WEIGHT: 257.2 LBS | BODY MASS INDEX: 43.91 KG/M2 | HEART RATE: 94 BPM

## 2021-07-28 DIAGNOSIS — R73.09 ELEVATED GLUCOSE: ICD-10-CM

## 2021-07-28 DIAGNOSIS — F17.200 TOBACCO USE DISORDER: ICD-10-CM

## 2021-07-28 DIAGNOSIS — R53.83 FATIGUE, UNSPECIFIED TYPE: ICD-10-CM

## 2021-07-28 DIAGNOSIS — E78.2 MODERATE MIXED HYPERLIPIDEMIA NOT REQUIRING STATIN THERAPY: Primary | ICD-10-CM

## 2021-07-28 DIAGNOSIS — D64.9 ANEMIA, UNSPECIFIED TYPE: ICD-10-CM

## 2021-07-28 PROBLEM — S46.819A TRAPEZIUS MUSCLE STRAIN: Status: ACTIVE | Noted: 2020-11-11

## 2021-07-28 PROBLEM — G43.909 MIGRAINES: Status: ACTIVE | Noted: 2021-07-28

## 2021-07-28 PROBLEM — Z86.32 HISTORY OF GESTATIONAL DIABETES: Status: ACTIVE | Noted: 2021-07-28

## 2021-07-28 PROBLEM — M54.2 CERVICAL PAIN (NECK): Status: ACTIVE | Noted: 2020-10-28

## 2021-07-28 PROBLEM — N20.0 KIDNEY STONES: Status: ACTIVE | Noted: 2021-07-28

## 2021-07-28 PROBLEM — F41.9 ANXIETY: Status: ACTIVE | Noted: 2021-07-28

## 2021-07-28 PROBLEM — E11.9 DIABETES (HCC): Status: RESOLVED | Noted: 2021-07-28 | Resolved: 2021-07-28

## 2021-07-28 PROBLEM — E11.9 DIABETES (HCC): Status: ACTIVE | Noted: 2021-07-28

## 2021-07-28 PROBLEM — F32.A DEPRESSION: Status: ACTIVE | Noted: 2021-07-28

## 2021-07-28 PROBLEM — E78.5 HYPERLIPIDEMIA: Status: ACTIVE | Noted: 2021-07-28

## 2021-07-28 LAB
ALBUMIN SERPL-MCNC: 4.1 G/DL (ref 3.5–5.2)
ALBUMIN/GLOB SERPL: 1.6 G/DL
ALP SERPL-CCNC: 78 U/L (ref 39–117)
ALT SERPL W P-5'-P-CCNC: 21 U/L (ref 1–33)
ANION GAP SERPL CALCULATED.3IONS-SCNC: 11.1 MMOL/L (ref 5–15)
AST SERPL-CCNC: 15 U/L (ref 1–32)
BASOPHILS # BLD AUTO: 0.04 10*3/MM3 (ref 0–0.2)
BASOPHILS NFR BLD AUTO: 0.6 % (ref 0–1.5)
BILIRUB SERPL-MCNC: 0.4 MG/DL (ref 0–1.2)
BUN SERPL-MCNC: 11 MG/DL (ref 6–20)
BUN/CREAT SERPL: 11.6 (ref 7–25)
CALCIUM SPEC-SCNC: 9.2 MG/DL (ref 8.6–10.5)
CHLORIDE SERPL-SCNC: 107 MMOL/L (ref 98–107)
CHOLEST SERPL-MCNC: 214 MG/DL (ref 0–200)
CO2 SERPL-SCNC: 22.9 MMOL/L (ref 22–29)
CREAT SERPL-MCNC: 0.95 MG/DL (ref 0.57–1)
DEPRECATED RDW RBC AUTO: 46.1 FL (ref 37–54)
EOSINOPHIL # BLD AUTO: 0.19 10*3/MM3 (ref 0–0.4)
EOSINOPHIL NFR BLD AUTO: 2.8 % (ref 0.3–6.2)
ERYTHROCYTE [DISTWIDTH] IN BLOOD BY AUTOMATED COUNT: 12.9 % (ref 12.3–15.4)
FERRITIN SERPL-MCNC: 101 NG/ML (ref 13–150)
FOLATE SERPL-MCNC: >20 NG/ML (ref 4.78–24.2)
GFR SERPL CREATININE-BSD FRML MDRD: 63 ML/MIN/1.73
GLOBULIN UR ELPH-MCNC: 2.5 GM/DL
GLUCOSE SERPL-MCNC: 95 MG/DL (ref 65–99)
HBA1C MFR BLD: 5.4 % (ref 4.8–5.6)
HCT VFR BLD AUTO: 41.3 % (ref 34–46.6)
HDLC SERPL-MCNC: 41 MG/DL (ref 40–60)
HGB BLD-MCNC: 13.8 G/DL (ref 12–15.9)
IMM GRANULOCYTES # BLD AUTO: 0.02 10*3/MM3 (ref 0–0.05)
IMM GRANULOCYTES NFR BLD AUTO: 0.3 % (ref 0–0.5)
IRON 24H UR-MRATE: 85 MCG/DL (ref 37–145)
IRON SATN MFR SERPL: 19 % (ref 20–50)
LDLC SERPL CALC-MCNC: 118 MG/DL (ref 0–100)
LDLC/HDLC SERPL: 2.7 {RATIO}
LYMPHOCYTES # BLD AUTO: 2.49 10*3/MM3 (ref 0.7–3.1)
LYMPHOCYTES NFR BLD AUTO: 37.2 % (ref 19.6–45.3)
MCH RBC QN AUTO: 32.4 PG (ref 26.6–33)
MCHC RBC AUTO-ENTMCNC: 33.4 G/DL (ref 31.5–35.7)
MCV RBC AUTO: 96.9 FL (ref 79–97)
MONOCYTES # BLD AUTO: 0.56 10*3/MM3 (ref 0.1–0.9)
MONOCYTES NFR BLD AUTO: 8.4 % (ref 5–12)
NEUTROPHILS NFR BLD AUTO: 3.4 10*3/MM3 (ref 1.7–7)
NEUTROPHILS NFR BLD AUTO: 50.7 % (ref 42.7–76)
NRBC BLD AUTO-RTO: 0 /100 WBC (ref 0–0.2)
PLATELET # BLD AUTO: 337 10*3/MM3 (ref 140–450)
PMV BLD AUTO: 11.1 FL (ref 6–12)
POTASSIUM SERPL-SCNC: 4.3 MMOL/L (ref 3.5–5.2)
PROT SERPL-MCNC: 6.6 G/DL (ref 6–8.5)
RBC # BLD AUTO: 4.26 10*6/MM3 (ref 3.77–5.28)
SODIUM SERPL-SCNC: 141 MMOL/L (ref 136–145)
TIBC SERPL-MCNC: 447 MCG/DL (ref 298–536)
TRANSFERRIN SERPL-MCNC: 300 MG/DL (ref 200–360)
TRIGL SERPL-MCNC: 312 MG/DL (ref 0–150)
VIT B12 BLD-MCNC: 403 PG/ML (ref 211–946)
VLDLC SERPL-MCNC: 55 MG/DL (ref 5–40)
WBC # BLD AUTO: 6.7 10*3/MM3 (ref 3.4–10.8)

## 2021-07-28 PROCEDURE — 80061 LIPID PANEL: CPT | Performed by: NURSE PRACTITIONER

## 2021-07-28 PROCEDURE — 99214 OFFICE O/P EST MOD 30 MIN: CPT | Performed by: NURSE PRACTITIONER

## 2021-07-28 PROCEDURE — 85025 COMPLETE CBC W/AUTO DIFF WBC: CPT | Performed by: NURSE PRACTITIONER

## 2021-07-28 PROCEDURE — 80053 COMPREHEN METABOLIC PANEL: CPT | Performed by: NURSE PRACTITIONER

## 2021-07-28 PROCEDURE — 83540 ASSAY OF IRON: CPT | Performed by: NURSE PRACTITIONER

## 2021-07-28 PROCEDURE — 84466 ASSAY OF TRANSFERRIN: CPT | Performed by: NURSE PRACTITIONER

## 2021-07-28 PROCEDURE — 83036 HEMOGLOBIN GLYCOSYLATED A1C: CPT | Performed by: NURSE PRACTITIONER

## 2021-07-28 PROCEDURE — 82746 ASSAY OF FOLIC ACID SERUM: CPT | Performed by: NURSE PRACTITIONER

## 2021-07-28 PROCEDURE — 82728 ASSAY OF FERRITIN: CPT | Performed by: NURSE PRACTITIONER

## 2021-07-28 PROCEDURE — 82607 VITAMIN B-12: CPT | Performed by: NURSE PRACTITIONER

## 2021-07-28 RX ORDER — BUPROPION HYDROCHLORIDE 150 MG/1
150 TABLET, EXTENDED RELEASE ORAL 2 TIMES DAILY
Qty: 180 TABLET | Refills: 5 | Status: SHIPPED | OUTPATIENT
Start: 2021-07-28 | End: 2022-03-30 | Stop reason: SDUPTHER

## 2021-07-28 NOTE — PROGRESS NOTES
"Chief Complaint  Follow-up (3 month) and Hyperlipidemia    Subjective          Ada Schuster presents to [facility]  History of Present Illness  She is here for 3-month follow-up.  She has a history of LAP-BAND surgery.    Hyperlipidemia: Her last lipid panel was 10/28/2020, her triglycerides were 364, total cholesterol 225, , HDL 44.    Smoking: She has decreased down to a fourth a pack per day now on Wellbutrin.  She states she is only taking Wellbutrin once daily.  She denies any depression.    She has a history of gestational diabetes in 2004.  She had labs in April during an ER visit.  It was noted that her glucose level was slightly elevated.  It was also noted that she had some anemia with hemoglobin 11.2.  She does complain of fatigue.    Patient was also seen by Paris Todd, NP student.    Objective   Vital Signs:   /86   Pulse 94   Temp 98.2 °F (36.8 °C)   Ht 162.6 cm (64\")   Wt 117 kg (257 lb 3.2 oz)   SpO2 96%   BMI 44.15 kg/m²     Physical Exam  Vitals reviewed.   Constitutional:       Appearance: Normal appearance. She is well-developed.   Neck:      Thyroid: No thyroid mass, thyromegaly or thyroid tenderness.   Cardiovascular:      Rate and Rhythm: Normal rate and regular rhythm.      Heart sounds: No murmur heard.   No friction rub. No gallop.    Pulmonary:      Effort: Pulmonary effort is normal.      Breath sounds: Normal breath sounds. No wheezing or rhonchi.   Lymphadenopathy:      Cervical: No cervical adenopathy.   Skin:     General: Skin is warm and dry.   Neurological:      Mental Status: She is alert and oriented to person, place, and time.      Cranial Nerves: No cranial nerve deficit.   Psychiatric:         Mood and Affect: Mood and affect normal.         Behavior: Behavior normal.         Thought Content: Thought content normal. Thought content does not include homicidal or suicidal ideation.         Judgment: Judgment normal. "        Result Review :                 Assessment and Plan    Diagnoses and all orders for this visit:    1. Moderate mixed hyperlipidemia not requiring statin therapy (Primary)  Comments:  We will check lipid panel today with labs.  Information on dyslipidemia provided.  Orders:  -     Comprehensive Metabolic Panel  -     Lipid Panel    2. Elevated glucose  Comments:  We will check an A1c today with labs.  Orders:  -     Hemoglobin A1c    3. Anemia, unspecified type  Comments:  We will check CBC, iron profile, ferritin and B12 and folate today.  Orders:  -     CBC & Differential  -     Iron Profile  -     Ferritin  -     Vitamin B12 & Folate    4. Fatigue, unspecified type  -     CBC & Differential  -     Vitamin B12 & Folate    5. Tobacco use disorder  Comments:  Advised to increase Wellbutrin dose to twice daily to help her to completely quit smoking.    Other orders  -     buPROPion SR (WELLBUTRIN SR) 150 MG 12 hr tablet; Take 1 tablet by mouth 2 (Two) Times a Day.  Dispense: 180 tablet; Refill: 5        Follow Up   Return in about 6 months (around 1/28/2022) for Next scheduled follow up.  Patient was given instructions and counseling regarding her condition or for health maintenance advice. Please see specific information pulled into the AVS if appropriate.

## 2021-07-28 NOTE — PATIENT INSTRUCTIONS
Dyslipidemia  Dyslipidemia is an imbalance of waxy, fat-like substances (lipids) in the blood. The body needs lipids in small amounts. Dyslipidemia often involves a high level of cholesterol or triglycerides, which are types of lipids.  Common forms of dyslipidemia include:  · High levels of LDL cholesterol. LDL is the type of cholesterol that causes fatty deposits (plaques) to build up in the blood vessels that carry blood away from your heart (arteries).  · Low levels of HDL cholesterol. HDL cholesterol is the type of cholesterol that protects against heart disease. High levels of HDL remove the LDL buildup from arteries.  · High levels of triglycerides. Triglycerides are a fatty substance in the blood that is linked to a buildup of plaques in the arteries.  What are the causes?  Primary dyslipidemia is caused by changes (mutations) in genes that are passed down through families (inherited). These mutations cause several types of dyslipidemia.  Secondary dyslipidemia is caused by lifestyle choices and diseases that lead to dyslipidemia, such as:  · Eating a diet that is high in animal fat.  · Not getting enough exercise.  · Having diabetes, kidney disease, liver disease, or thyroid disease.  · Drinking large amounts of alcohol.  · Using certain medicines.  What increases the risk?  You are more likely to develop this condition if you are an older man or if you are a woman who has gone through menopause. Other risk factors include:  · Having a family history of dyslipidemia.  · Taking certain medicines, including birth control pills, steroids, some diuretics, and beta-blockers.  · Smoking cigarettes.  · Eating a high-fat diet.  · Having certain medical conditions such as diabetes, polycystic ovary syndrome (PCOS), kidney disease, liver disease, or hypothyroidism.  · Not exercising regularly.  · Being overweight or obese with too much belly fat.  What are the signs or symptoms?  In most cases, dyslipidemia does not  usually cause any symptoms.  In severe cases, very high lipid levels can cause:  · Fatty bumps under the skin (xanthomas).  · White or gray ring around the black center (pupil) of the eye.  Very high triglyceride levels can cause inflammation of the pancreas (pancreatitis).  How is this diagnosed?  Your health care provider may diagnose dyslipidemia based on a routine blood test (fasting blood test). Because most people do not have symptoms of the condition, this blood testing (lipid profile) is done on adults age 20 and older and is repeated every 5 years. This test checks:  · Total cholesterol. This measures the total amount of cholesterol in your blood, including LDL cholesterol, HDL cholesterol, and triglycerides. A healthy number is below 200.  · LDL cholesterol. The target number for LDL cholesterol is different for each person, depending on individual risk factors. Ask your health care provider what your LDL cholesterol should be.  · HDL cholesterol. An HDL level of 60 or higher is best because it helps to protect against heart disease. A number below 40 for men or below 50 for women increases the risk for heart disease.  · Triglycerides. A healthy triglyceride number is below 150.  If your lipid profile is abnormal, your health care provider may do other blood tests.  How is this treated?  Treatment depends on the type of dyslipidemia that you have and your other risk factors for heart disease and stroke. Your health care provider will have a target range for your lipid levels based on this information.  For many people, this condition may be treated by lifestyle changes, such as diet and exercise. Your health care provider may recommend that you:  · Get regular exercise.  · Make changes to your diet.  · Quit smoking if you smoke.  If diet changes and exercise do not help you reach your goals, your health care provider may also prescribe medicine to lower lipids. The most commonly prescribed type of medicine  lowers your LDL cholesterol (statin drug). If you have a high triglyceride level, your provider may prescribe another type of drug (fibrate) or an omega-3 fish oil supplement, or both.  Follow these instructions at home:    Eating and drinking  · Follow instructions from your health care provider or dietitian about eating or drinking restrictions.  · Eat a healthy diet as told by your health care provider. This can help you reach and maintain a healthy weight, lower your LDL cholesterol, and raise your HDL cholesterol. This may include:  ? Limiting your calories, if you are overweight.  ? Eating more fruits, vegetables, whole grains, fish, and lean meats.  ? Limiting saturated fat, trans fat, and cholesterol.  · If you drink alcohol:  ? Limit how much you use.  ? Be aware of how much alcohol is in your drink. In the [place] one drink equals one 12 oz bottle of beer (355 mL), one 5 oz glass of wine (148 mL), or one 1½ oz glass of hard liquor (44 mL).  · Do not drink alcohol if:  ? Your health care provider tells you not to drink.  ? You are pregnant, may be pregnant, or are planning to become pregnant.  Activity  · Get regular exercise. Start an exercise and strength training program as told by your health care provider. Ask your health care provider what activities are safe for you. Your health care provider may recommend:  ? 30 minutes of aerobic activity 4-6 days a week. Brisk walking is an example of aerobic activity.  ? Strength training 2 days a week.  General instructions  · Do not use any products that contain nicotine or tobacco, such as cigarettes, e-cigarettes, and chewing tobacco. If you need help quitting, ask your health care provider.  · Take over-the-counter and prescription medicines only as told by your health care provider. This includes supplements.  · Keep all follow-up visits as told by your health care provider.  Contact a health care provider if:  · You are:  ? Having trouble sticking to your  exercise or diet plan.  ? Struggling to quit smoking or control your use of alcohol.  Summary  · Dyslipidemia often involves a high level of cholesterol or triglycerides, which are types of lipids.  · Treatment depends on the type of dyslipidemia that you have and your other risk factors for heart disease and stroke.  · For many people, treatment starts with lifestyle changes, such as diet and exercise.  · Your health care provider may prescribe medicine to lower lipids.  This information is not intended to replace advice given to you by your health care provider. Make sure you discuss any questions you have with your health care provider.  Document Revised: 08/12/2019 Document Reviewed: 07/19/2019  [employer] Patient Education © 2021 [employer]    Fatigue  If you have fatigue, you feel tired all the time and have a lack of energy or a lack of motivation. Fatigue may make it difficult to start or complete tasks because of exhaustion. In general, occasional or mild fatigue is often a normal response to activity or life. However, long-lasting (chronic) or extreme fatigue may be a symptom of a medical condition.  Follow these instructions at home:  General instructions  · Watch your fatigue for any changes.  · Go to bed and get up at the same time every day.  · Avoid fatigue by pacing yourself during the day and getting enough sleep at night.  · Maintain a healthy weight.  Medicines  · Take over-the-counter and prescription medicines only as told by your health care provider.  · Take a multivitamin, if told by your health care provider.   · Do not use herbal or dietary supplements unless they are approved by your health care provider.  Activity    · Exercise regularly, as told by your health care provider.  · Use or practice techniques to help you relax, such as yoga, sameer chi, meditation, or massage therapy.  Eating and drinking    · Avoid heavy meals in the evening.  · Eat a well-balanced diet, which includes lean  proteins, whole grains, plenty of fruits and vegetables, and low-fat dairy products.  · Avoid consuming too much caffeine.  · Avoid the use of alcohol.  · Drink enough fluid to keep your urine pale yellow.  Lifestyle  · Change situations that cause you stress. Try to keep your work and personal schedule in balance.  · Do not use any products that contain nicotine or tobacco, such as cigarettes and e-cigarettes. If you need help quitting, ask your health care provider.  · Do not use drugs.  Contact a health care provider if:  · Your fatigue does not get better.  · You have a fever.  · You suddenly lose or gain weight.  · You have headaches.  · You have trouble falling asleep or sleeping through the night.  · You feel angry, guilty, anxious, or sad.  · You are unable to have a bowel movement (constipation).  · Your skin is dry.  · You have swelling in your legs or another part of your body.  Get help right away if:  · You feel confused.  · Your vision is blurry.  · You feel faint or you pass out.  · You have a severe headache.  · You have severe pain in your abdomen, your back, or the area between your waist and hips (pelvis).  · You have chest pain, shortness of breath, or an irregular or fast heartbeat.  · You are unable to urinate, or you urinate less than normal.  · You have abnormal bleeding, such as bleeding from the rectum, vagina, nose, lungs, or nipples.  · You vomit blood.  · You have thoughts about hurting yourself or others.  If you ever feel like you may hurt yourself or others, or have thoughts about taking your own life, get help right away. You can go to your nearest emergency department or call:  · Your local emergency services (911 in the [place]  · A suicide crisis helpline, such as the National Suicide Prevention Lifeline at 1-722.696.8232. This is open 24 hours a day.  Summary  · If you have fatigue, you feel tired all the time and have a lack of energy or a lack of motivation.  · Fatigue may make  it difficult to start or complete tasks because of exhaustion.  · Long-lasting (chronic) or extreme fatigue may be a symptom of a medical condition.  · Exercise regularly, as told by your health care provider.  · Change situations that cause you stress. Try to keep your work and personal schedule in balance.  This information is not intended to replace advice given to you by your health care provider. Make sure you discuss any questions you have with your health care provider.  Document Revised: 07/08/2020 Document Reviewed: 09/12/2018  [employer] Patient Education © 2021 [employer]    Heart-Healthy Eating Plan  Heart-healthy meal planning includes:  · Eating less unhealthy fats.  · Eating more healthy fats.  · Making other changes in your diet.  Talk with your doctor or a diet specialist (dietitian) to create an eating plan that is right for you.  What is my plan?  Your doctor may recommend an eating plan that includes:  · Total fat: ______% or less of total calories a day.  · Saturated fat: ______% or less of total calories a day.  · Cholesterol: less than _________mg a day.  What are tips for following this plan?  Cooking  Avoid frying your food. Try to bake, boil, grill, or broil it instead. You can also reduce fat by:  · Removing the skin from poultry.  · Removing all visible fats from meats.  · Steaming vegetables in water or broth.  Meal planning    · At meals, divide your plate into four equal parts:  ? Fill one-half of your plate with vegetables and green salads.  ? Fill one-fourth of your plate with whole grains.  ? Fill one-fourth of your plate with lean protein foods.  · Eat 4-5 servings of vegetables per day. A serving of vegetables is:  ? 1 cup of raw or cooked vegetables.  ? 2 cups of raw leafy greens.  · Eat 4-5 servings of fruit per day. A serving of fruit is:  ? 1 medium whole fruit.  ? ¼ cup of dried fruit.  ? ½ cup of fresh, frozen, or canned fruit.  ? ½ cup of 100% fruit juice.  · Eat more  foods that have soluble fiber. These are apples, broccoli, carrots, beans, peas, and barley. Try to get 20-30 g of fiber per day.  · Eat 4-5 servings of nuts, legumes, and seeds per week:  ? 1 serving of dried beans or legumes equals ½ cup after being cooked.  ? 1 serving of nuts is ¼ cup.  ? 1 serving of seeds equals 1 tablespoon.  General information  · Eat more home-cooked food. Eat less restaurant, buffet, and fast food.  · Limit or avoid alcohol.  · Limit foods that are high in starch and sugar.  · Avoid fried foods.  · Lose weight if you are overweight.  · Keep track of how much salt (sodium) you eat. This is important if you have high blood pressure. Ask your doctor to tell you more about this.  · Try to add vegetarian meals each week.  Fats  · Choose healthy fats. These include olive oil and canola oil, flaxseeds, walnuts, almonds, and seeds.  · Eat more omega-3 fats. These include salmon, mackerel, sardines, tuna, flaxseed oil, and ground flaxseeds. Try to eat fish at least 2 times each week.  · Check food labels. Avoid foods with trans fats or high amounts of saturated fat.  · Limit saturated fats.  ? These are often found in animal products, such as meats, butter, and cream.  ? These are also found in plant foods, such as palm oil, palm kernel oil, and coconut oil.  · Avoid foods with partially hydrogenated oils in them. These have trans fats. Examples are stick margarine, some tub margarines, cookies, crackers, and other baked goods.  What foods can I eat?  Fruits  All fresh, canned (in natural juice), or frozen fruits.  Vegetables  Fresh or frozen vegetables (raw, steamed, roasted, or grilled). Green salads.  Grains  Most grains. Choose whole wheat and whole grains most of the time. Rice and pasta, including brown rice and pastas made with whole wheat.  Meats and other proteins  Lean, well-trimmed beef, veal, pork, and lamb. Chicken and turkey without skin. All fish and shellfish. Wild duck, rabbit,  pheasant, and venison. Egg whites or low-cholesterol egg substitutes. Dried beans, peas, lentils, and tofu. Seeds and most nuts.  Dairy  Low-fat or nonfat cheeses, including ricotta and mozzarella. Skim or 1% milk that is liquid, powdered, or evaporated. Buttermilk that is made with low-fat milk. Nonfat or low-fat yogurt.  Fats and oils  Non-hydrogenated (trans-free) margarines. Vegetable oils, including soybean, sesame, sunflower, olive, peanut, safflower, corn, canola, and cottonseed. Salad dressings or mayonnaise made with a vegetable oil.  Beverages  Mineral water. Coffee and tea. Diet carbonated beverages.  Sweets and desserts  Sherbet, gelatin, and fruit ice. Small amounts of dark chocolate.  Limit all sweets and desserts.  Seasonings and condiments  All seasonings and condiments.  The items listed above may not be a complete list of foods and drinks you can eat. Contact a dietitian for more options.  What foods should I avoid?  Fruits  Canned fruit in heavy syrup. Fruit in cream or butter sauce. Fried fruit. Limit coconut.  Vegetables  Vegetables cooked in cheese, cream, or butter sauce. Fried vegetables.  Grains  Breads that are made with saturated or trans fats, oils, or whole milk. Croissants. Sweet rolls. Donuts. High-fat crackers, such as cheese crackers.  Meats and other proteins  Fatty meats, such as hot dogs, ribs, sausage, rajput, rib-eye roast or steak. High-fat deli meats, such as salami and bologna. Caviar. Domestic duck and goose. Organ meats, such as liver.  Dairy  Cream, sour cream, cream cheese, and creamed cottage cheese. Whole-milk cheeses. Whole or 2% milk that is liquid, evaporated, or condensed. Whole buttermilk. Cream sauce or high-fat cheese sauce. Yogurt that is made from whole milk.  Fats and oils  Meat fat, or shortening. Cocoa butter, hydrogenated oils, palm oil, coconut oil, palm kernel oil. Solid fats and shortenings, including rajput fat, salt pork, lard, and butter. Nondairy cream  substitutes. Salad dressings with cheese or sour cream.  Beverages  Regular sodas and juice drinks with added sugar.  Sweets and desserts  Frosting. Pudding. Cookies. Cakes. Pies. Milk chocolate or white chocolate. Buttered syrups. Full-fat ice cream or ice cream drinks.  The items listed above may not be a complete list of foods and drinks to avoid. Contact a dietitian for more information.  Summary  · Heart-healthy meal planning includes eating less unhealthy fats, eating more healthy fats, and making other changes in your diet.  · Eat a balanced diet. This includes fruits and vegetables, low-fat or nonfat dairy, lean protein, nuts and legumes, whole grains, and heart-healthy oils and fats.  This information is not intended to replace advice given to you by your health care provider. Make sure you discuss any questions you have with your health care provider.  Document Revised: 02/21/2019 Document Reviewed: 01/25/2019  [employer] Patient Education © 2021 [employer]

## 2022-03-08 ENCOUNTER — E-VISIT (OUTPATIENT)
Dept: FAMILY MEDICINE CLINIC | Facility: TELEHEALTH | Age: 47
End: 2022-03-08

## 2022-03-08 PROCEDURE — BRIGHTMDVISIT: Performed by: NURSE PRACTITIONER

## 2022-03-08 NOTE — EXTERNAL PATIENT INSTRUCTIONS
Diagnosis   Urinary tract infection (UTI)   My name is Tiffanie Bland. I'm a healthcare provider at [facility]. After reviewing your interview, I see you have a urinary tract infection (UTI).   Medications   Your pharmacy   [id number] [address] [place] [id number] [phone]     Prescription      Phenazopyridine (200mg): Take 1 tablet by mouth three times a day as needed for 2 days for pain or discomfort associated with your condition.      Nitrofurantoin monohydrate/macrocrystalline (100mg): Take 1 tablet by mouth every 12 hours for 5 days for infection. This medication is an antibiotic. Take it exactly as directed. You must finish the entire course of medication, even if you feel better after taking the first few doses.    I've given you a prescription dose of phenazopyridine. If it's more affordable or convenient, you may use the equivalent amount of non-prescription phenazopyridine. For example, instead of taking one 200 mg phenazopyridine tablet, you may take two 95 mg phenazopyridine tablets.   About your diagnosis   A UTI is an infection of one or more parts of the urinary tract, most commonly the bladder.   Most UTIs are caused by bacteria (usually E. coli.) that travel up the urethra and affect the bladder. I see that you have some common signs and symptoms of a UTI:    Pain or burning while urinating    Frequent urination    Sudden urge to urinate    Symptoms that feel a lot like past UTIs    Mild or moderate pain, pressure, or discomfort in your lower abdomen    Strange or strong smelling urine    Symptoms that began after sexual intercourse   Fortunately, most UTIs aren't serious, and they're easily treated with antibiotics. Make sure you take all of the antibiotic pills given to you. Otherwise, the UTI might come back.   What to expect   If you follow this treatment plan, you should start to feel better within 1 to 2 days.   When to seek care   Call us at [phone]   with any sudden or unexpected symptoms.    Symptoms that don't improve or get worse in the next 48 hours    Fever that goes above 101F or lasts longer than 24 hours    Shaking or chills    Nausea or vomiting    Severe flank pain (pain in your back or side)   Other treatment    Rest and drink plenty of water    Urinate frequently and when you first feel the urge    Place a heating pad on your back or stomach to help relieve some of the discomfort   Prevention    Drink a lot of liquids to help flush bacteria from your system. Water is best. Try for six to eight, 8-ounce glasses a day on a regular basis.    Urinate often and when you first feel the urge. Bacteria can grow when urine stays in the bladder too long. Urinate after sex to flush away bacteria.    After using the toilet, always wipe from front to back. This step is most important after a bowel movement. Wiping from front to back prevents bacteria normally found in stool from entering the urinary tract.   Your provider   Your diagnosis was provided by Tiffanie Bland, a member of your trusted care team at [facility].   If you have any questions, call us at [phone]  .

## 2022-03-08 NOTE — E-VISIT TREATED
Chief Complaint: Bladder infection (UTI)   Patient introduction   Patient is 45-year-old female who reports dysuria, urinary frequency, and urinary urgency for < 24 hours.   Urine is described as pink or red with a strong or pungent odor.   Patient provided the following organ inventory: Presence of a vagina, ovaries, a uterus, and breasts.   Patient did not request an excuse note.   General presentation   Denies fever. Denies nausea and vomiting.   Reports mild abdominal or pelvic pain.   Denies back pain.   Denies flank pain. Reports difficulty starting, stopping, or delaying urination.   Reports trying ibuprofen for their current symptoms. They found it unhelpful.   Reports previous history of UTI. Current symptoms feel exactly the same as previous UTIs. Reports receiving treatment for UTI 1-3 times in last year. Patient's last use of antibiotics for UTI was > 1 month ago.   Reports taking amoxicillin-clavulanate and ciprofloxacin for their past UTIs. Reports all were helpful.   Reports developing yeast infections as a result of taking antibiotics for past UTIs.   Reports sexual activity in the past week. Denies current diaphragm use. Denies unprotected sexual intercourse with a new partner in the last 2 weeks. Denies exposure to sexually transmitted infections in the last month.   Patient denies current treatment for diabetes mellitus.   Denies the following red flags:    Recent hospitalizations or nursing home care (last 3 months)    History of renal failure    Recent history of pyelonephritis (within the last year)    Recent history of nephrolithiasis (within the last year)    Recent history of urological instrumentation    Anatomic abnormalities of the urinary tract    Abnormal vaginal discharge    Visible vaginal sores    Pain with sexual intercourse    Abnormal vaginal bleeding or spotting   Pregnancy/menstrual status/breastfeeding:   Patient is menopausal.   Current medications   Reports taking  BuPROPion (Eqv-Wellbutrin SR), Biotin, Acid Reducer (Famotidine), Cranberry, ibuprofen 200 MG [Anadin Ibuprofen], and Allergy Relief (Loratadine).   Medication allergies   None.   Medication contraindication review   Denies currently taking ACE inhibitors and ARBs.   Denies history of anaphylactic reaction to beta-lactams; folate deficiency; G6PD deficiency; arrhythmia; coronary artery disease; megaloblastic anemia; mononucleosis; myasthenia gravis; cholestatic jaundice; oliguria/anuria; and TMP/SMX-associated thrombocytopenia.   No known history of amoxicillin-clavulanate-associated cholestatic jaundice or nitrofurantoin-associated cholestatic jaundice.   Past medical history   Immune conditions: Denies immunocompromising conditions. Denies history of cancer.   Assessment   Uncomplicated acute UTI.   This is the likely diagnosis based on patient's reported symptoms and history, including:    Previous history of UTI    Current symptoms are exactly the same as previous UTIs    Mild pelvic or abdominal pain    Urine described as pink or red with a strong odor   Plan   Medications:    phenazopyridine 200 mg tablet RX 200mg 1 tab PO tid PRN 2d for pain or discomfort associated with your condition. Amount is 6 tab.    nitrofurantoin monohydrate/macrocrystals 100 mg capsule RX 100mg 1 cap PO q12h 5d for infection. This medication is an antibiotic. Take it exactly as directed. You must finish the entire course of medication, even if you feel better after taking the first few doses. Amount is 10 cap.   The patient's prescriptions will be sent to:   [id number]   [address] [place] [id number]   Phone: [phone]     Fax: [phone]   Education:    Condition and causes    Prevention    Treatment and self-care    When to call provider   Follow-up:   Patient to follow up as needed for progression or lack of improvement in symptoms within 3d.      ----------   Electronically signed by Tiffanie  DOROTHY Bland on 2022-03-08 at 17:29PM   ----------   Patient Interview Transcript:   Knowing about your anatomy is important for diagnosing and treating UTIs. The gender we have on file for you is female, but we realize that this might not tell the whole story. Would you like to tell us more about your anatomy?    Yes   Not selected:    No   OK, which of these organs do you have? Select all that apply.    Vagina    Ovaries    Uterus    Breasts   Not selected:    Penis    Testes    Prostate   Which of these symptoms do you have? Select all that apply.    Pain or burning while urinating    Frequent urination    Sudden urge to urinate   How long have you had these symptoms? Select one.    Less than 24 hours   Not selected:    1 to 3 days    4 to 6 days    7 to 10 days    More than 10 days   Since your current symptoms started, has it been difficult to start, stop, or delay urination? Select one.    Yes   Not selected:    No    I'm not sure   What color is your urine? Select one.    Pink or red   Not selected:    Clear    Cloudy    Yellow    I'm not sure   Does your urine smell strange (like ammonia) or stronger than usual? Select one.    Yes   Not selected:    No   Do your symptoms include any of these? Select all that apply.    Pain, pressure, or discomfort in the lower abdomen   Not selected:    Fever    Nausea    Vomiting    Back pain    No   How would you describe your lower abdominal pain, pressure, or discomfort? Select one.    Mild   Not selected:    Moderate    Severe   Do you have any flank pain? The flank is the side of the body between the ribs and the hips.    No   Not selected:    Yes, in my left flank    Yes, in my right flank    Yes, in both my left and right flanks   Do you have any vaginal symptoms? Select all that apply.    No   Not selected:    Abnormal vaginal itching    Unscheduled or abnormal vaginal bleeding or spotting    Pain during sex    Visible sores on the vagina    Abnormal vaginal  discharge   In the past 2 weeks, have you had a medical device or instrument placed in your urinary tract? Examples include catheters, stents, and nephrostomy tubes. Select one.    No   Not selected:    Yes   Have you recently been hospitalized or been a resident of a nursing home or other long-term care facility? This doesn't include emergency room (ER) visits. Select one.    No   Not selected:    Yes, within the last 2 weeks    Yes, within the last 3 months   Have you ever had severe problems with your kidneys, such as kidney failure? Select one.    No   Not selected:    Yes   Have you ever had a kidney infection (pyelonephritis)? Select one.    Yes, over a year ago   Not selected:    Yes, within the last year    No    I'm not sure   Have you ever had kidney stones? Select one.    Yes, over a year ago   Not selected:    Yes, within the last year    No    I'm not sure   Have you ever been diagnosed with any of these? Select all that apply.    No   Not selected:    Urinary reflux    Bladder diverticula    Single (or horseshoe) kidney    Duplicated urethra    I'm not sure   Have you recently held your urine for a long time after you felt the urge to go? Select one.    No   Not selected:    Yes   Have you recently avoided eating or drinking so you wouldn't have the urge to urinate as often? Select one.    No   Not selected:    Yes   Do you currently use a diaphragm? Select one.    No   Not selected:    Yes   Have you gone through menopause? Select one.    I'm going through it now   Not selected:    Yes    No   Have you had sexual intercourse in the past week? Recent sexual intercourse is a risk factor for urinary tract infections. Select one.    Yes   Not selected:    No   Have you been exposed to a sexually transmitted infection (STI or STD) in the last month? Examples include chlamydia, gonorrhea, trichomoniasis, and herpes. Select one.    No, not that I know of   Not selected:    Yes    I'm not sure   Have you had  unprotected sexual intercourse with a new partner in the last 2 weeks? Select one.    No   Not selected:    Yes   Have you traveled outside of the [place] in the last 6 months? Select one.    No   Not selected:    Yes   Have you taken any of these non-prescription medications for your current symptoms? Non-prescription medications are the kind you can buy without a prescription. Select any that may apply.    Ibuprofen (Advil, Motrin)   Not selected:    Acetaminophen (Tylenol)    Phenazopyridine (Azo, Pyridium, Baridium, Uricalm, Uristat)    No   Did ibuprofen work well for you? Select one.    No   Not selected:    Yes   Have you ever had a urinary tract infection (UTI) before? A UTI is often called a bladder infection. Select one.    Yes   Not selected:    No    I'm not sure   How much do your current symptoms feel like past UTIs? Select one.    Exactly the same   Not selected:    Mostly the same    Somewhat the same    Totally different   In the past year, how many times have you taken antibiotics for a UTI? Select one.    1 to 3   Not selected:    0    4 or more   When did you last take antibiotics for a UTI? Select one.    More than 1 month ago   Not selected:    Less than 1 month ago   Which of these antibiotics have you taken for UTIs in the past? Select all that apply.    Amoxicillin-clavulanate (Augmentin)    Ciprofloxacin (Cipro)   Not selected:    Cefdinir (Omnicef)    Cefuroxime (Ceftin)    Cephalexin (Daxbia, Keflex)    Fosfomycin    Nitrofurantoin (Macrobid)    TMP/SMX (Bactrim, Bactrim DS, Sulfatrim)    Trimethoprim (Primsol)    Other (specify)    I'm not sure   What worked well for UTI symptoms in the past? You may not get the same medication this time. But this information helps your provider choose the best treatment for you. Select all that apply.    Amoxicillin-clavulanate (Augmentin)    Ciprofloxacin (Cipro)   Not selected:    Cefdinir (Omnicef)    Cefuroxime (Ceftin)    Cephalexin (Daxbia,  Keflex)    Fosfomycin    Nitrofurantoin (Macrobid)    TMP/SMX (Bactrim, Bactrim DS, Sulfatrim)    Trimethoprim (Primsol)    Other (specify)    None of the above   You mentioned using ciprofloxacin (Cipro) in the past. Have you used this antibiotic in the last 3 months? Select one.    No   Not selected:    Yes    I'm not sure   Have you ever developed a yeast infection as a result of taking antibiotics? Select one.    Yes   Not selected:    No    I'm not sure   UTIs may be more serious when other factors are present. Let's address those now. Are you currently being treated for type 1 or type 2 diabetes? Select one.    No   Not selected:    Yes   Do you have any of these conditions that can affect the immune system? Scroll to see all options. Select all that apply.    None of these   Not selected:    History of bone marrow transplant    Chronic kidney disease    Chronic liver disease (including cirrhosis)    HIV/AIDS    Inflammatory bowel disease (Crohn's disease or ulcerative colitis)    Lupus    Moderate to severe plaque psoriasis    Multiple sclerosis    Rheumatoid arthritis    Sickle cell anemia    Alpha or beta thalassemia    History of solid organ transplant (kidney, liver, or heart)    History of spleen removal    An autoimmune disorder not listed here    A condition requiring treatment with long-term use of oral steroids (such as prednisone, prednisolone, or dexamethasone)   Have you ever been diagnosed with cancer? Select one.    No   Not selected:    Yes, I have cancer now    Yes, but I'm in remission   These last few questions will help us create the right treatment plan for you. Are you currently being treated for any of these conditions? Select all that apply.    No   Not selected:    Anaphylactic reaction to beta-lactam antibiotics (penicillins, cephalosporins, carbapenems)    Folate deficiency    G6PD deficiency    Heart arrhythmia    Heart disease    Megaloblastic anemia    Mono (mononucleosis)     Myasthenia gravis    Oliguria or anuria    TMP/SMX-associated low blood platelet count (thrombocytopenia)   Have you ever had jaundice as a result of taking amoxicillin-clavulanate (Augmentin) or nitrofurantoin (Macrobid)? Select all that apply.    No   Not selected:    Yes, from amoxicillin-clavulanate (Augmentin)    Yes, from nitrofurantoin (Macrobid)   Are you taking any of these medications? Select all that apply.    No   Not selected:    An ACE inhibitor such as lisinopril, enalapril, captopril, or benazepril    An angiotensin II receptor blocker (ARB) such as candesartan, irbesartan, losartan, or valsartan   Are you taking any other medications or supplements? On the next screen, you need to list all vitamins, supplements, non-prescription medications (such as aspirin or Aleve), and prescription medications that you're taking. Select one.    Yes   Not selected:    Yes, but I'm not sure what they are    No   Have you ever had an allergic or bad reaction to any medication? Select one.    No   Not selected:    Yes   Do you need a doctor's note? A doctor's note confirms that you received care today and states when you can return to school or work. It does not contain information about your diagnosis or treatment plan. Your provider will make the final decision on whether to give you a doctor's note. Doctor's notes CANNOT be backdated. Select one.    No   Not selected:    Today only (1 day)   Is there anything else you'd like to tell us about your symptoms?   The patient did not enter any additional information.   ----------   Medical history   The following information was received from the EMR on March 08, 2022.   Allergies:    Not on File   - Allergy Type:   - Reaction:   - Severity:   - Status: Active

## 2022-03-23 ENCOUNTER — E-VISIT (OUTPATIENT)
Dept: FAMILY MEDICINE CLINIC | Facility: TELEHEALTH | Age: 47
End: 2022-03-23

## 2022-03-23 NOTE — E-VISIT ESCALATED
Patient escalated   Provider Alejandrina Cantu chose to escalate patient to another level of care because: Patient's free text comments   Patient was sent the following message:   Based on the information you've provided us, you need to take another step to get care. please go to urgent care for in person evaluation due to having antibiotics prescribed 15 days ago for suspected UTI   What to do now:   Urgent Care   You should be seen within the next 24 hours.   Please go to a walk-in clinic or urgent care, or make an appointment to be seen within the next 24 hours.   You won't be charged for your eVisit. If you paid with a credit card, the charge will be reversed.   Chief Complaint: Bladder infection (UTI)   Patient introduction   Patient is 46-year-old female who reports urinary frequency and urinary urgency for 1-3 days.   Urine is described as yellow with no unusual odor.   Patient provided the following organ inventory: Presence of a vagina, ovaries, a uterus, and breasts.   Warning. The following may warrant further investigation:    Took an unknown antibiotic for UTI less than 1 month ago   Patient did not request an excuse note.   General presentation   Denies fever. Denies nausea and vomiting.   Reports mild abdominal or pelvic pain.   Reports mild back pain.   Reports bilateral flank pain.   Reports trying ibuprofen for their current symptoms. They found it unhelpful.   Reports previous history of UTI. Current symptoms feel mostly the same as previous UTIs. Reports receiving treatment for UTI 1-3 times in last year. Took an unknown antibiotic for UTI less than 1 month ago.   Uncertain whether treated past UTIs with any of the following: amoxicillin-clavulanate, cefdinir, cefuroxime, cephalexin, ciprofloxacin, fosfomycin, nitrofurantoin monohydrate, TMP/SMX, or trimethoprim.   Reports developing yeast infections as a result of taking antibiotics for past UTIs.   Reports sexual activity in the past week.  Denies current diaphragm use. Denies unprotected sexual intercourse with a new partner in the last 2 weeks. Denies exposure to sexually transmitted infections in the last month.   Patient denies current treatment for diabetes mellitus.   Denies the following red flags:    Recent hospitalizations or nursing home care (last 3 months)    History of renal failure    Recent history of pyelonephritis (within the last year)    Recent history of nephrolithiasis (within the last year)    Recent history of urological instrumentation    Anatomic abnormalities of the urinary tract    Abnormal vaginal discharge    Visible vaginal sores    Pain with sexual intercourse    Abnormal vaginal bleeding or spotting   Pregnancy/menstrual status/breastfeeding:   Patient is menopausal.   Current medications   Reports taking Wellbutrin and Acid Reducer (Famotidine).   Medication allergies   None.   Medication contraindication review   Denies currently taking ACE inhibitors and ARBs.   Denies history of anaphylactic reaction to beta-lactams; folate deficiency; G6PD deficiency; arrhythmia; coronary artery disease; megaloblastic anemia; mononucleosis; myasthenia gravis; cholestatic jaundice; oliguria/anuria; and TMP/SMX-associated thrombocytopenia.   No known history of amoxicillin-clavulanate-associated cholestatic jaundice or nitrofurantoin-associated cholestatic jaundice.   Past medical history   Immune conditions: Denies immunocompromising conditions. Denies history of cancer.   Assessment:   Patient determined to need a level of care not appropriate to be delivered through eVisit.   Plan:   Patient informed of need to seek in-person care      ----------   Electronically signed by DOROTHY Bush on 2022-03-23 at 03:57AM   ----------   Patient Interview Transcript:   Knowing about your anatomy is important for diagnosing and treating UTIs. The gender we have on file for you is female, but we realize that this might not tell the whole  story. Would you like to tell us more about your anatomy?    Yes   Not selected:    No   OK, which of these organs do you have? Select all that apply.    Vagina    Ovaries    Uterus    Breasts   Not selected:    Penis    Testes    Prostate   Which of these symptoms do you have? Select all that apply.    Frequent urination    Sudden urge to urinate   Not selected:    Pain or burning while urinating   How long have you had these symptoms? Select one.    1 to 3 days   Not selected:    Less than 24 hours    4 to 6 days    7 to 10 days    More than 10 days   Since your current symptoms started, has it been difficult to start, stop, or delay urination? Select one.    I'm not sure   Not selected:    Yes    No   What color is your urine? Select one.    Yellow   Not selected:    Clear    Cloudy    Pink or red    I'm not sure   Does your urine smell strange (like ammonia) or stronger than usual? Select one.    No   Not selected:    Yes   Do your symptoms include any of these? Select all that apply.    Pain, pressure, or discomfort in the lower abdomen    Back pain   Not selected:    Fever    Nausea    Vomiting    No   How would you describe your lower abdominal pain, pressure, or discomfort? Select one.    Mild   Not selected:    Moderate    Severe   How would you describe your back pain? Select one.    Mild, achy pain   Not selected:    Moderate pain that gets in the way of my daily activities    Severe, sharp pain that keeps me from my daily activities   Do you have any flank pain? The flank is the side of the body between the ribs and the hips.    Yes, in both my left and right flanks   Not selected:    Yes, in my left flank    Yes, in my right flank    No   Do you have any vaginal symptoms? Select all that apply.    No   Not selected:    Abnormal vaginal itching    Unscheduled or abnormal vaginal bleeding or spotting    Pain during sex    Visible sores on the vagina    Abnormal vaginal discharge   In the past 2 weeks,  have you had a medical device or instrument placed in your urinary tract? Examples include catheters, stents, and nephrostomy tubes. Select one.    No   Not selected:    Yes   Have you recently been hospitalized or been a resident of a nursing home or other long-term care facility? This doesn't include emergency room (ER) visits. Select one.    No   Not selected:    Yes, within the last 2 weeks    Yes, within the last 3 months   Have you ever had severe problems with your kidneys, such as kidney failure? Select one.    No   Not selected:    Yes   Have you ever had a kidney infection (pyelonephritis)? Select one.    Yes, over a year ago   Not selected:    Yes, within the last year    No    I'm not sure   Have you ever had kidney stones? Select one.    Yes, over a year ago   Not selected:    Yes, within the last year    No    I'm not sure   Have you ever been diagnosed with any of these? Select all that apply.    No   Not selected:    Urinary reflux    Bladder diverticula    Single (or horseshoe) kidney    Duplicated urethra    I'm not sure   Have you recently held your urine for a long time after you felt the urge to go? Select one.    No   Not selected:    Yes   Have you recently avoided eating or drinking so you wouldn't have the urge to urinate as often? Select one.    No   Not selected:    Yes   Do you currently use a diaphragm? Select one.    No   Not selected:    Yes   Have you gone through menopause? Select one.    I'm going through it now   Not selected:    Yes    No   Have you had sexual intercourse in the past week? Recent sexual intercourse is a risk factor for urinary tract infections. Select one.    Yes   Not selected:    No   Have you been exposed to a sexually transmitted infection (STI or STD) in the last month? Examples include chlamydia, gonorrhea, trichomoniasis, and herpes. Select one.    No, not that I know of   Not selected:    Yes    I'm not sure   Have you had unprotected sexual intercourse  with a new partner in the last 2 weeks? Select one.    No   Not selected:    Yes   Have you traveled outside of the [place] in the last 6 months? Select one.    No   Not selected:    Yes   Have you taken any of these non-prescription medications for your current symptoms? Non-prescription medications are the kind you can buy without a prescription. Select any that may apply.    Ibuprofen (Advil, Motrin)   Not selected:    Acetaminophen (Tylenol)    Phenazopyridine (Azo, Pyridium, Baridium, Uricalm, Uristat)    No   Did ibuprofen work well for you? Select one.    No   Not selected:    Yes   Have you ever had a urinary tract infection (UTI) before? A UTI is often called a bladder infection. Select one.    Yes   Not selected:    No    I'm not sure   How much do your current symptoms feel like past UTIs? Select one.    Mostly the same   Not selected:    Exactly the same    Somewhat the same    Totally different   In the past year, how many times have you taken antibiotics for a UTI? Select one.    1 to 3   Not selected:    0    4 or more   When did you last take antibiotics for a UTI? Select one.    Less than 1 month ago   Not selected:    More than 1 month ago   Which antibiotics did you take for your last UTI? Since it was so recent, we need to know which antibiotic(s) you took. You may not get the same medication this time. But this information helps your provider choose the best treatment for you. Select all that apply.    I'm not sure   Not selected:    Amoxicillin-clavulanate (Augmentin)    Cefdinir (Omnicef)    Cefuroxime (Ceftin)    Cephalexin (Daxbia, Keflex)    Ciprofloxacin (Cipro)    Fosfomycin    Nitrofurantoin (Macrobid)    TMP/SMX (Bactrim, Bactrim DS, Sulfatrim)    Trimethoprim (Primsol)    Other (specify)   Which of these antibiotics have you taken for UTIs in the past? Select all that apply.    I'm not sure   Not selected:    Amoxicillin-clavulanate (Augmentin)    Cefdinir (Omnicef)    Cefuroxime  (Ceftin)    Cephalexin (Daxbia, Keflex)    Ciprofloxacin (Cipro)    Fosfomycin    Nitrofurantoin (Macrobid)    TMP/SMX (Bactrim, Bactrim DS, Sulfatrim)    Trimethoprim (Primsol)    Other (specify)   Have you ever developed a yeast infection as a result of taking antibiotics? Select one.    Yes   Not selected:    No    I'm not sure   UTIs may be more serious when other factors are present. Let's address those now. Are you currently being treated for type 1 or type 2 diabetes? Select one.    No   Not selected:    Yes   Do you have any of these conditions that can affect the immune system? Scroll to see all options. Select all that apply.    None of these   Not selected:    History of bone marrow transplant    Chronic kidney disease    Chronic liver disease (including cirrhosis)    HIV/AIDS    Inflammatory bowel disease (Crohn's disease or ulcerative colitis)    Lupus    Moderate to severe plaque psoriasis    Multiple sclerosis    Rheumatoid arthritis    Sickle cell anemia    Alpha or beta thalassemia    History of solid organ transplant (kidney, liver, or heart)    History of spleen removal    An autoimmune disorder not listed here    A condition requiring treatment with long-term use of oral steroids (such as prednisone, prednisolone, or dexamethasone)   Have you ever been diagnosed with cancer? Select one.    No   Not selected:    Yes, I have cancer now    Yes, but I'm in remission   These last few questions will help us create the right treatment plan for you. Are you currently being treated for any of these conditions? Select all that apply.    No   Not selected:    Anaphylactic reaction to beta-lactam antibiotics (penicillins, cephalosporins, carbapenems)    Folate deficiency    G6PD deficiency    Heart arrhythmia    Heart disease    Megaloblastic anemia    Mono (mononucleosis)    Myasthenia gravis    Oliguria or anuria    TMP/SMX-associated low blood platelet count (thrombocytopenia)   Have you ever had jaundice  as a result of taking amoxicillin-clavulanate (Augmentin) or nitrofurantoin (Macrobid)? Select all that apply.    No   Not selected:    Yes, from amoxicillin-clavulanate (Augmentin)    Yes, from nitrofurantoin (Macrobid)   Are you taking any of these medications? Select all that apply.    No   Not selected:    An ACE inhibitor such as lisinopril, enalapril, captopril, or benazepril    An angiotensin II receptor blocker (ARB) such as candesartan, irbesartan, losartan, or valsartan   Are you taking any other medications or supplements? On the next screen, you need to list all vitamins, supplements, non-prescription medications (such as aspirin or Aleve), and prescription medications that you're taking. Select one.    Yes   Not selected:    Yes, but I'm not sure what they are    No   Have you ever had an allergic or bad reaction to any medication? Select one.    No   Not selected:    Yes   Do you need a doctor's note? A doctor's note confirms that you received care today and states when you can return to school or work. It does not contain information about your diagnosis or treatment plan. Your provider will make the final decision on whether to give you a doctor's note. Doctor's notes CANNOT be backdated. Select one.    No   Not selected:    Today only (1 day)   Is there anything else you'd like to tell us about your symptoms?   The patient did not enter any additional information.   ----------   Medical history   Medical history data does not currently exist for this patient.

## 2022-03-24 ENCOUNTER — APPOINTMENT (OUTPATIENT)
Dept: CT IMAGING | Facility: HOSPITAL | Age: 47
End: 2022-03-24

## 2022-03-24 ENCOUNTER — HOSPITAL ENCOUNTER (EMERGENCY)
Facility: HOSPITAL | Age: 47
Discharge: HOME | End: 2022-03-24
Attending: EMERGENCY MEDICINE | Admitting: EMERGENCY MEDICINE

## 2022-03-24 VITALS
TEMPERATURE: 100.5 F | DIASTOLIC BLOOD PRESSURE: 81 MMHG | SYSTOLIC BLOOD PRESSURE: 123 MMHG | BODY MASS INDEX: 46.37 KG/M2 | HEART RATE: 79 BPM | OXYGEN SATURATION: 94 % | RESPIRATION RATE: 18 BRPM | WEIGHT: 271.61 LBS | HEIGHT: 64 IN

## 2022-03-24 DIAGNOSIS — N20.1 LEFT URETERAL CALCULUS: Primary | ICD-10-CM

## 2022-03-24 DIAGNOSIS — N23 URETERAL COLIC: ICD-10-CM

## 2022-03-24 LAB
ALBUMIN SERPL-MCNC: 4.4 G/DL (ref 3.5–5.2)
ALBUMIN/GLOB SERPL: 1.4 G/DL
ALP SERPL-CCNC: 94 U/L (ref 39–117)
ALT SERPL W P-5'-P-CCNC: 25 U/L (ref 1–33)
AMORPH URATE CRY URNS QL MICRO: ABNORMAL /HPF
ANION GAP SERPL CALCULATED.3IONS-SCNC: 9.8 MMOL/L (ref 5–15)
AST SERPL-CCNC: 18 U/L (ref 1–32)
BACTERIA UR QL AUTO: ABNORMAL /HPF
BASOPHILS # BLD AUTO: 0.03 10*3/MM3 (ref 0–0.2)
BASOPHILS NFR BLD AUTO: 0.3 % (ref 0–1.5)
BILIRUB SERPL-MCNC: 0.3 MG/DL (ref 0–1.2)
BILIRUB UR QL STRIP: NEGATIVE
BUN SERPL-MCNC: 15 MG/DL (ref 6–20)
BUN/CREAT SERPL: 12.6 (ref 7–25)
CALCIUM SPEC-SCNC: 10.1 MG/DL (ref 8.6–10.5)
CHLORIDE SERPL-SCNC: 100 MMOL/L (ref 98–107)
CLARITY UR: ABNORMAL
CO2 SERPL-SCNC: 25.2 MMOL/L (ref 22–29)
COD CRY URNS QL: ABNORMAL /HPF
COLOR UR: YELLOW
CREAT SERPL-MCNC: 1.19 MG/DL (ref 0.57–1)
DEPRECATED RDW RBC AUTO: 44.2 FL (ref 37–54)
EGFRCR SERPLBLD CKD-EPI 2021: 56.5 ML/MIN/1.73
EOSINOPHIL # BLD AUTO: 0.16 10*3/MM3 (ref 0–0.4)
EOSINOPHIL NFR BLD AUTO: 1.5 % (ref 0.3–6.2)
ERYTHROCYTE [DISTWIDTH] IN BLOOD BY AUTOMATED COUNT: 12.7 % (ref 12.3–15.4)
GLOBULIN UR ELPH-MCNC: 3.2 GM/DL
GLUCOSE SERPL-MCNC: 114 MG/DL (ref 65–99)
GLUCOSE UR STRIP-MCNC: NEGATIVE MG/DL
HCG INTACT+B SERPL-ACNC: <0.5 MIU/ML
HCT VFR BLD AUTO: 37.6 % (ref 34–46.6)
HGB BLD-MCNC: 13.2 G/DL (ref 12–15.9)
HGB UR QL STRIP.AUTO: ABNORMAL
HOLD SPECIMEN: NORMAL
HOLD SPECIMEN: NORMAL
HYALINE CASTS UR QL AUTO: ABNORMAL /LPF
IMM GRANULOCYTES # BLD AUTO: 0.04 10*3/MM3 (ref 0–0.05)
IMM GRANULOCYTES NFR BLD AUTO: 0.4 % (ref 0–0.5)
KETONES UR QL STRIP: ABNORMAL
LEUKOCYTE ESTERASE UR QL STRIP.AUTO: NEGATIVE
LIPASE SERPL-CCNC: 33 U/L (ref 13–60)
LYMPHOCYTES # BLD AUTO: 2.55 10*3/MM3 (ref 0.7–3.1)
LYMPHOCYTES NFR BLD AUTO: 24.1 % (ref 19.6–45.3)
MCH RBC QN AUTO: 33.2 PG (ref 26.6–33)
MCHC RBC AUTO-ENTMCNC: 35.1 G/DL (ref 31.5–35.7)
MCV RBC AUTO: 94.5 FL (ref 79–97)
MONOCYTES # BLD AUTO: 0.9 10*3/MM3 (ref 0.1–0.9)
MONOCYTES NFR BLD AUTO: 8.5 % (ref 5–12)
NEUTROPHILS NFR BLD AUTO: 6.9 10*3/MM3 (ref 1.7–7)
NEUTROPHILS NFR BLD AUTO: 65.2 % (ref 42.7–76)
NITRITE UR QL STRIP: NEGATIVE
NRBC BLD AUTO-RTO: 0 /100 WBC (ref 0–0.2)
PH UR STRIP.AUTO: 5.5 [PH] (ref 5–8)
PLATELET # BLD AUTO: 396 10*3/MM3 (ref 140–450)
PMV BLD AUTO: 9.7 FL (ref 6–12)
POTASSIUM SERPL-SCNC: 3.9 MMOL/L (ref 3.5–5.2)
PROT SERPL-MCNC: 7.6 G/DL (ref 6–8.5)
PROT UR QL STRIP: NEGATIVE
RBC # BLD AUTO: 3.98 10*6/MM3 (ref 3.77–5.28)
RBC # UR STRIP: ABNORMAL /HPF
REF LAB TEST METHOD: ABNORMAL
SODIUM SERPL-SCNC: 135 MMOL/L (ref 136–145)
SP GR UR STRIP: >=1.03 (ref 1–1.03)
SQUAMOUS #/AREA URNS HPF: ABNORMAL /HPF
UROBILINOGEN UR QL STRIP: ABNORMAL
WBC # UR STRIP: ABNORMAL /HPF
WBC NRBC COR # BLD: 10.58 10*3/MM3 (ref 3.4–10.8)
WHOLE BLOOD HOLD SPECIMEN: NORMAL
WHOLE BLOOD HOLD SPECIMEN: NORMAL

## 2022-03-24 PROCEDURE — 25010000002 HYDROMORPHONE 1 MG/ML SOLUTION: Performed by: EMERGENCY MEDICINE

## 2022-03-24 PROCEDURE — 96374 THER/PROPH/DIAG INJ IV PUSH: CPT

## 2022-03-24 PROCEDURE — 81001 URINALYSIS AUTO W/SCOPE: CPT | Performed by: EMERGENCY MEDICINE

## 2022-03-24 PROCEDURE — 96375 TX/PRO/DX INJ NEW DRUG ADDON: CPT

## 2022-03-24 PROCEDURE — 84702 CHORIONIC GONADOTROPIN TEST: CPT | Performed by: EMERGENCY MEDICINE

## 2022-03-24 PROCEDURE — 99283 EMERGENCY DEPT VISIT LOW MDM: CPT

## 2022-03-24 PROCEDURE — 0 IOPAMIDOL PER 1 ML: Performed by: EMERGENCY MEDICINE

## 2022-03-24 PROCEDURE — 80053 COMPREHEN METABOLIC PANEL: CPT | Performed by: EMERGENCY MEDICINE

## 2022-03-24 PROCEDURE — 25010000002 ONDANSETRON PER 1 MG: Performed by: EMERGENCY MEDICINE

## 2022-03-24 PROCEDURE — 36415 COLL VENOUS BLD VENIPUNCTURE: CPT

## 2022-03-24 PROCEDURE — 83690 ASSAY OF LIPASE: CPT | Performed by: EMERGENCY MEDICINE

## 2022-03-24 PROCEDURE — 85025 COMPLETE CBC W/AUTO DIFF WBC: CPT | Performed by: EMERGENCY MEDICINE

## 2022-03-24 PROCEDURE — 74177 CT ABD & PELVIS W/CONTRAST: CPT

## 2022-03-24 RX ORDER — ONDANSETRON 2 MG/ML
4 INJECTION INTRAMUSCULAR; INTRAVENOUS ONCE
Status: COMPLETED | OUTPATIENT
Start: 2022-03-24 | End: 2022-03-24

## 2022-03-24 RX ORDER — HYDROCODONE BITARTRATE AND ACETAMINOPHEN 5; 325 MG/1; MG/1
1 TABLET ORAL EVERY 6 HOURS PRN
Qty: 15 TABLET | Refills: 0 | Status: SHIPPED | OUTPATIENT
Start: 2022-03-24 | End: 2022-03-30

## 2022-03-24 RX ORDER — SODIUM CHLORIDE 0.9 % (FLUSH) 0.9 %
10 SYRINGE (ML) INJECTION AS NEEDED
Status: DISCONTINUED | OUTPATIENT
Start: 2022-03-24 | End: 2022-03-24 | Stop reason: HOSPADM

## 2022-03-24 RX ORDER — ONDANSETRON 4 MG/1
4 TABLET, ORALLY DISINTEGRATING ORAL EVERY 8 HOURS PRN
Qty: 12 TABLET | Refills: 0 | Status: SHIPPED | OUTPATIENT
Start: 2022-03-24 | End: 2022-03-30

## 2022-03-24 RX ADMIN — SODIUM CHLORIDE 1000 ML: 9 INJECTION, SOLUTION INTRAVENOUS at 12:21

## 2022-03-24 RX ADMIN — ONDANSETRON 4 MG: 2 INJECTION INTRAMUSCULAR; INTRAVENOUS at 12:22

## 2022-03-24 RX ADMIN — HYDROMORPHONE HYDROCHLORIDE 1 MG: 1 INJECTION, SOLUTION INTRAMUSCULAR; INTRAVENOUS; SUBCUTANEOUS at 12:22

## 2022-03-24 RX ADMIN — IOPAMIDOL 100 ML: 755 INJECTION, SOLUTION INTRAVENOUS at 12:42

## 2022-03-24 NOTE — ED PROVIDER NOTES
Time: 11:38 AM EDT  Arrived by: private car  Chief Complaint: Abdominal pain  History provided by: Patient  History is limited by: Nothing    History of Present Illness:  Patient is a 46 y.o. year old female who presents to the emergency department with abdominal pain.  The patient states that she has had lower abdominal pain for the last couple of days.  She also has had nausea vomiting she does have a low-grade fever she denies any diarrhea.  The patient states that she has had diverticulitis previously.  Patient said no URI symptoms sore throat or cough.  The patient states that she is status post cholecystectomy and appendectomy.  She also has a LAP-BAND in place.    HPI    Similar Symptoms Previously: Yes  Recently seen: No    Patient Care Team  Primary Care Provider: January Short APRN    Past Medical History:     No Known Allergies  Past Medical History:   Diagnosis Date   • Allergies    • Anemia    • Anxiety    • Cataract    • Cervical pain (neck) 10/28/2020   • Depression    • Diabetes (HCC)    • Gall stones    • History of hyperlipidemia 10/28/2020   • Hyperlipidemia    • Hypertension    • Kidney stones    • Migraines    • Radiculopathy of arm 10/28/2020   • Skin lesion of breast 10/28/2020    Right    • Trapezius muscle strain 2020     Past Surgical History:   Procedure Laterality Date   • APPENDECTOMY     •  SECTION     • CHOLECYSTECTOMY     • EAR TUBES     • INNER EAR SURGERY      Inner ear reconstruction   • LAPAROSCOPIC GASTRIC BANDING       Family History   Problem Relation Age of Onset   • Diabetes Father    • Diabetes Paternal Grandmother    • Heart disease Other        Home Medications:  Prior to Admission medications    Medication Sig Start Date End Date Taking? Authorizing Provider   acetaminophen (TYLENOL) 500 MG tablet acetaminophen 500 mg oral tablet take 2 tablets (1,000 mg) by oral route every 6 hours as needed   Active    Provider, MD María    buPROPion SR (WELLBUTRIN SR) 150 MG 12 hr tablet Take 1 tablet by mouth 2 (Two) Times a Day. 7/28/21   January Short APRN   ibuprofen (ADVIL,MOTRIN) 400 MG tablet ibuprofen 400 mg oral tablet take 1 tablet (400 mg) by oral route 3 times per day with food   Active    ProviderMaría MD   Melatonin 10 MG tablet melatonin 10 mg oral tablet take 1 tablet by oral route daily   Active    Provider, MD María   Methylcellulose, Laxative, (FIBER THERAPY PO) Take  by mouth.    ProviderMaría MD   multivitamin with minerals tablet tablet Take 1 tablet by mouth Daily.    ProviderMaría MD        Social History:   Social History     Tobacco Use   • Smoking status: Current Every Day Smoker     Packs/day: 0.25     Years: 6.00     Pack years: 1.50   • Smokeless tobacco: Never Used   • Tobacco comment: Started smoking at age 16 smoked 5 cigarette(s) per day   Vaping Use   • Vaping Use: Never used   Substance Use Topics   • Alcohol use: Yes     Comment: Rarely      Recent travel: no     Review of Systems:  Review of Systems   Constitutional: Negative for activity change, diaphoresis and unexpected weight change.   HENT: Negative for rhinorrhea, sinus pain, sore throat and voice change.    Eyes: Negative for pain, discharge and visual disturbance.   Respiratory: Negative for cough, chest tightness, shortness of breath and wheezing.    Cardiovascular: Negative for chest pain and palpitations.   Gastrointestinal: Positive for abdominal pain, nausea and vomiting. Negative for diarrhea.   Endocrine: Negative for cold intolerance, polydipsia and polyuria.   Genitourinary: Positive for dysuria. Negative for flank pain, hematuria and urgency.   Musculoskeletal: Negative for arthralgias, gait problem, myalgias and neck stiffness.   Skin: Negative for color change, rash and wound.   Allergic/Immunologic: Negative for environmental allergies.   Neurological: Negative for dizziness, seizures, syncope, weakness  "and light-headedness.   Hematological: Negative.    Psychiatric/Behavioral: Negative.         Physical Exam:  /81   Pulse 79   Temp 100.5 °F (38.1 °C) (Oral)   Resp 18   Ht 162.6 cm (64\")   Wt 123 kg (271 lb 9.7 oz)   SpO2 94%   Breastfeeding No   BMI 46.62 kg/m²     Physical Exam  Constitutional:       Appearance: She is well-developed.   HENT:      Head: Normocephalic and atraumatic.      Mouth/Throat:      Mouth: Mucous membranes are moist.      Pharynx: Oropharynx is clear.   Eyes:      Extraocular Movements: Extraocular movements intact.      Pupils: Pupils are equal, round, and reactive to light.   Cardiovascular:      Rate and Rhythm: Normal rate and regular rhythm.      Heart sounds: Normal heart sounds.   Pulmonary:      Effort: Pulmonary effort is normal.      Breath sounds: Normal breath sounds.   Abdominal:      General: Abdomen is flat. Bowel sounds are normal.      Palpations: Abdomen is soft.      Tenderness: There is abdominal tenderness. There is guarding. There is no rebound.      Hernia: No hernia is present.   Skin:     General: Skin is warm.      Capillary Refill: Capillary refill takes less than 2 seconds.   Neurological:      Mental Status: She is alert and oriented to person, place, and time.   Psychiatric:         Mood and Affect: Mood normal.         Behavior: Behavior normal.                Medications in the Emergency Department:  Medications   sodium chloride 0.9 % bolus 1,000 mL (0 mL Intravenous Stopped 3/24/22 1345)   HYDROmorphone (DILAUDID) injection 1 mg (1 mg Intravenous Given 3/24/22 1222)   ondansetron (ZOFRAN) injection 4 mg (4 mg Intravenous Given 3/24/22 1222)   iopamidol (ISOVUE-370) 76 % injection 100 mL (100 mL Intravenous Given 3/24/22 1242)        Labs  Lab Results (last 24 hours)     ** No results found for the last 24 hours. **           Imaging:  No Radiology Exams Resulted Within Past 24 Hours    Procedures:  Procedures    Progress                      "       Medical Decision Making:  [facility]     Final diagnoses:   Left ureteral calculus   Ureteral colic        Disposition:  ED Disposition     ED Disposition   Discharge    Condition   Stable    Comment   --             This medical record created using voice recognition software and may contain unintended errors.           Adonis Villa,   03/24/22 1143       Adonis Villa,   03/29/22 0938

## 2022-03-24 NOTE — DISCHARGE INSTRUCTIONS
Drink plenty of fluids.  Strain your urine.  Take medications as directed.  Return for fever or signs of infection, uncontrolled pain, persistent vomiting.  Follow-up with Dr. Pro by calling for an appointment.

## 2022-03-30 ENCOUNTER — OFFICE VISIT (OUTPATIENT)
Dept: FAMILY MEDICINE CLINIC | Facility: CLINIC | Age: 47
End: 2022-03-30

## 2022-03-30 VITALS
SYSTOLIC BLOOD PRESSURE: 138 MMHG | TEMPERATURE: 99.2 F | BODY MASS INDEX: 46.44 KG/M2 | HEIGHT: 64 IN | DIASTOLIC BLOOD PRESSURE: 88 MMHG | OXYGEN SATURATION: 95 % | HEART RATE: 91 BPM | WEIGHT: 272 LBS

## 2022-03-30 DIAGNOSIS — Z12.4 ENCOUNTER FOR PAPANICOLAOU SMEAR OF CERVIX: Primary | ICD-10-CM

## 2022-03-30 DIAGNOSIS — Z12.31 ENCOUNTER FOR SCREENING MAMMOGRAM FOR MALIGNANT NEOPLASM OF BREAST: ICD-10-CM

## 2022-03-30 DIAGNOSIS — F33.1 MODERATE EPISODE OF RECURRENT MAJOR DEPRESSIVE DISORDER: ICD-10-CM

## 2022-03-30 PROCEDURE — 99396 PREV VISIT EST AGE 40-64: CPT | Performed by: NURSE PRACTITIONER

## 2022-03-30 PROCEDURE — G0123 SCREEN CERV/VAG THIN LAYER: HCPCS | Performed by: NURSE PRACTITIONER

## 2022-03-30 RX ORDER — BUPROPION HYDROCHLORIDE 150 MG/1
150 TABLET, EXTENDED RELEASE ORAL 2 TIMES DAILY
Qty: 180 TABLET | Refills: 11 | Status: SHIPPED | OUTPATIENT
Start: 2022-03-30

## 2022-03-30 NOTE — PROGRESS NOTES
"Chief Complaint  Gynecologic Exam    Subjective          Ada Schuster presents to [facility]  History of Present Illness   SUBJECTIVE:   46 y.o. female for annual routine Pap and checkup.    Depression/smoking cessation: She states she is doing well on the Wellbutrin.  She states she has decreased her smoking to a fourth a pack per day.  She also states that Wellbutrin does help with her depression.    Current Outpatient Medications   Medication Sig Dispense Refill   • acetaminophen (TYLENOL) 500 MG tablet acetaminophen 500 mg oral tablet take 2 tablets (1,000 mg) by oral route every 6 hours as needed   Active     • buPROPion SR (WELLBUTRIN SR) 150 MG 12 hr tablet Take 1 tablet by mouth 2 (Two) Times a Day. 180 tablet 11   • ibuprofen (ADVIL,MOTRIN) 400 MG tablet ibuprofen 400 mg oral tablet take 1 tablet (400 mg) by oral route 3 times per day with food   Active     • Melatonin 10 MG tablet melatonin 10 mg oral tablet take 1 tablet by oral route daily   Active     • Methylcellulose, Laxative, (FIBER THERAPY PO) Take  by mouth.     • multivitamin with minerals tablet tablet Take 1 tablet by mouth Daily.       No current facility-administered medications for this visit.     Allergies: Patient has no known allergies.   No LMP recorded. Patient is perimenopausal.    ROS:  Feeling well. No dyspnea or chest pain on exertion.  No abdominal pain, change in bowel habits, black or bloody stools.  No urinary tract symptoms. GYN ROS: normal menses, no abnormal bleeding, pelvic pain or discharge, no breast pain or new or enlarging lumps on self exam. No neurological complaints.    OBJECTIVE:   The patient appears well, alert, oriented x 3, in no distress.  /88   Pulse 91   Temp 99.2 °F (37.3 °C)   Ht 162.6 cm (64\")   Wt 123 kg (272 lb)   SpO2 95%   BMI 46.69 kg/m²   ENT normal, left PE tube intact.  Neck supple. No adenopathy or thyromegaly. Lungs are clear, good air entry, no " "wheezes, rhonchi or rales. S1 and S2 normal, no murmurs, regular rate and rhythm. Abdomen soft without tenderness, guarding, mass or organomegaly. Extremities show no edema, normal peripheral pulses. Neurological is normal, no focal findings.    BREAST EXAM: breasts appear normal, no suspicious masses, no skin or nipple changes or axillary nodes, she does have a small bruise on her left breast that she states was due to a previous pimple.    PELVIC EXAM: normal external genitalia, vulva, vagina, cervix, uterus and adnexa, PAP: Pap smear done today, thin-prep method, exam chaperoned by Alden Serrato CMA.     ASSESSMENT:   well woman    PLAN:   mammogram  pap smear  counseled on breast self exam, mammography screening and adequate intake of calcium and vitamin D  return annually or prn    Objective   Vital Signs:   /88   Pulse 91   Temp 99.2 °F (37.3 °C)   Ht 162.6 cm (64\")   Wt 123 kg (272 lb)   SpO2 95%   BMI 46.69 kg/m²        Result Review :                 Assessment and Plan    Diagnoses and all orders for this visit:    1. Encounter for Papanicolaou smear of cervix (Primary)  -     IGP, Rfx Aptima HPV ASCU    2. Moderate episode of recurrent major depressive disorder (HCC)  Assessment & Plan:  Patient's depression is single episode and is moderate without psychosis. Their depression is currently active and the condition is improving with treatment.  She is also decreased her smoking while being on Wellbutrin.  This will be reassessed at the next regular appointment. F/U as described:patient will continue current medication therapy.      3. Encounter for screening mammogram for malignant neoplasm of breast  -     Mammo Screening Digital Tomosynthesis Bilateral With CAD; Future    Other orders  -     buPROPion SR (WELLBUTRIN SR) 150 MG 12 hr tablet; Take 1 tablet by mouth 2 (Two) Times a Day.  Dispense: 180 tablet; Refill: 11      Follow Up   Return in about 1 year (around 3/30/2023) for Annual " physical.  Patient was given instructions and counseling regarding her condition or for health maintenance advice. Please see specific information pulled into the AVS if appropriate.

## 2022-03-30 NOTE — ASSESSMENT & PLAN NOTE
Patient's depression is single episode and is moderate without psychosis. Their depression is currently active and the condition is improving with treatment.  She is also decreased her smoking while being on Wellbutrin.  This will be reassessed at the next regular appointment. F/U as described:patient will continue current medication therapy.   20-Nov-2020

## 2022-04-06 LAB
CONV .: NORMAL
CYTOLOGIST CVX/VAG CYTO: NORMAL
CYTOLOGY CVX/VAG DOC CYTO: NORMAL
CYTOLOGY CVX/VAG DOC THIN PREP: NORMAL
DX ICD CODE: NORMAL
HIV 1 & 2 AB SER-IMP: NORMAL
OTHER STN SPEC: NORMAL
STAT OF ADQ CVX/VAG CYTO-IMP: NORMAL

## 2022-04-08 DIAGNOSIS — N20.0 KIDNEY STONE: Primary | ICD-10-CM

## 2022-04-20 ENCOUNTER — TELEPHONE (OUTPATIENT)
Dept: FAMILY MEDICINE CLINIC | Facility: CLINIC | Age: 47
End: 2022-04-20

## 2022-04-20 ENCOUNTER — TELEPHONE (OUTPATIENT)
Dept: UROLOGY | Facility: CLINIC | Age: 47
End: 2022-04-20

## 2022-04-20 NOTE — TELEPHONE ENCOUNTER
Matt with Urology in [facility] called to let us know that our mutual  patient cancelled her appointment with Nita Pro, for today through email.

## 2022-04-20 NOTE — TELEPHONE ENCOUNTER
PT CX'D HER APPT WITH DR UNGER FOR TODAY 4/20, CALLED REFERRING PROVIDER ARIE SNYDER OFFICE AND SPOKE JOSE AND LET HER KNOW PT DNKA.

## 2022-07-19 ENCOUNTER — E-VISIT (OUTPATIENT)
Dept: FAMILY MEDICINE CLINIC | Facility: TELEHEALTH | Age: 47
End: 2022-07-19

## 2022-07-19 PROCEDURE — BRIGHTMDVISIT: Performed by: NURSE PRACTITIONER

## 2022-07-19 NOTE — E-VISIT TREATED
Chief Complaint: Ear pain   Patient introduction   Patient is 47-year-old female who has an ache, pruritus, a blocked sensation, pressure, crackling/popping, and throbbing pain in their left ear.   Patient did not request an excuse note.   General presentation   Patient first noticed symptoms 4 to 5 days ago. They came on gradually. Symptoms are always there. Patient rates their pain as moderate (4 to 6/10).   Patient does not have a fever.   No symptoms of tinnitus. Patient has mild hearing loss in one ear. New or increased drainage from the left ear. Pain and discomfort didn't improve after drainage started. Drainage smells foul. Drainage is green.   PE tube in right ear.   Patient's outer ear is painful to the touch.   Patient regularly uses cotton swabs.   No recent airplane travel, scuba diving, hiking or driving in the mountains, or exposure to a loud blast or explosion. No swimming or water in ears in the last few weeks. No recent exposure to tobacco smoke, smoke from a fire or wood-burning stove, or air pollution.   Last antibiotic treatment for an ear infection was more than 1 year ago.   Review of alarm symptoms/red flags:    No current treatment by ENT    No current PE tubes in affected ear(s)    No mastoid or ear surgery in the last 5 years    No mastoid redness, warmth, or pain    No sharp or pointed object in ear canal    No foreign body in ear    No recent head trauma    No vision changes    No hearing loss in both ears    No red/bloody ear drainage   Pregnancy/menstrual status/breastfeeding:   Patient is menopausal.   Self-exam:    No difficulty moving their chin toward their chest    No mastoid redness, warmth, or pain    Pain is unchanged by chewing or moving the jaw    Pain is unaffected by manipulation of the pinna and/or pressure on the tragus    Pain is worse when lying down   Current medications   Patient has used the following OTC medication(s) for their ear symptoms: acetaminophen and  diphenhydramine.   Taking Melatonin, Famotidine, Bupropion, Ibuprofen, and Acetaminophen.   Medication allergies   None.   Medication contraindication review   No history of arrhythmia, congestive heart failure, recent myocardial infarction, heart block, heart disease, hypertension, hyperthyroidism, mononucleosis, or myasthenia gravis.   No known history of amoxicillin-clavulanate-associated jaundice or hepatic impairment.   No known history of azithromycin-associated cholestatic jaundice or hepatic impairment.   No history of aspirin triad; NSAID-induced asthma/urticaria; coronary artery disease; coagulation disorder; urinary retention; electrolyte abnormalities; G6PD deficiency; GI bleeding; hypertension; influenza, varicella, or febrile viral infection; or CABG surgery.   Past medical history   Immune conditions: No immunocompromising conditions. No history of cancer.   Assessment   Otitis externa.   This is the likely diagnosis based on patient's interview responses, including:    Ear symptoms described as an ache, pruritus, a blocked sensation, pressure, crackling/popping, and throbbing pain    Absence of fever    Foul-smelling ear drainage   Plan   Medications:    ibuprofen 800 mg tablet RX 800mg 1 tab PO q8h 10d PRN for any fever, pain, or discomfort associated with your condition. Take with food to avoid upset stomach. Do not exceed 3200 mg (4 tablets) in a 24-hour period. Amount is 30 tab.    ciprofloxacin 0.3 %-dexamethasone 0.1 % ear drops,suspension RX 0.3%/0.1% suspension 4 gtt in ear bid 7d lie down with affected ear upward for 1 minute after placing drops in ear. This medication is an antibiotic. Take it exactly as directed. You must finish the entire course of medication, even if you feel better after the first few days of treatment. Amount is 7.5 mL.   The patient's prescriptions will be sent to:   [id number]   [address] [id number]   Phone: [phone]     Fax:  [phone]   Education:    Condition and causes    Prevention    Treatment and self-care    When to call provider   ----------   Electronically signed by DOROTHY Day on 2022-07-19 at 01:30AM   ----------   Patient Interview Transcript:   How would you describe your ear pain or discomfort? Select all that apply.    Achy    Itchy    Blocked or plugged    Pressure    Crackling or popping    Throbbing   Not selected:    Full    Shooting/stabbing/sharp   On a scale of 1 to 10, how severe is your ear pain? Select one.    Moderate (4 to 6); it's pretty uncomfortable   Not selected:    Mild (1 to 3); it bothers me a little bit    Moderate to severe (7 to 9); it's intense    Very severe; it's unbearable (10+)   Which ear is affected? Select one.    My left ear   Not selected:    My right ear    Both of my ears   When did you first notice this ear pain or discomfort? Select one.    4 to 5 days ago   Not selected:    Today    Yesterday    2 to 3 days ago    More than 5 days ago   Did your ear pain or discomfort come on suddenly or over time? Select one.    Over time   Not selected:    Suddenly    I'm not sure   Is the outside of the affected ear red, swollen, warm to the touch, or painful to the touch? Select all that apply.    Painful to the touch   Not selected:    Red    Swollen    Warm to the touch    None of these   Is your ear pain or discomfort always there, or does it come and go? Select one.    Always there   Not selected:    Comes and goes    I'm not sure   Does the pain or discomfort get worse when you bite or chew? Select one.    No   Not selected:    Yes   Along with your ear symptoms, have you had a fever or felt hot? Select one.    No   Not selected:    Yes   Do you have any of these on the same side as your affected ear?    None of the above   Not selected:    Pain or tenderness over the bone behind your ear    Redness over the bone behind your ear    Warmth over the bone behind your ear   Do your  symptoms include the sudden onset of ringing in one or both ears? Select one.    No   Not selected:    Yes   Do your symptoms include hearing loss? Select one.    Yes, I can't hear as well as I usually do   Not selected:    Yes, I can't hear at all in either ear    No   Is your hearing loss in one ear or both ears? Select one.    One ear   Not selected:    Both ears   Has there been fluid draining out of either ear? Select one.    Yes, and this is new or more than the usual amount   Not selected:    Yes, but no more than usual    No   Which ear has fluid draining out of it (or more fluid draining out of it than usual)? Select one.    Left   Not selected:    Right    Both   Once your ear started draining, did your ear pain or discomfort suddenly get better? Select one.    No   Not selected:    Yes   Does the drainage smell bad or foul? Select one.    Yes   Not selected:    No   What color is the drainage? Select one.    Green   Not selected:    White or clear    Yellow    Kitchen brown, like earwax    Red or bloody    I'm not sure   Along with your ear symptoms, have you had any of these cold symptoms? Select all that apply.    None of the above   Not selected:    Runny nose    Stuffy nose (nasal congestion)    Postnasal drip    Sinus pain or pressure    Cough    Scratchy or sore throat   Along with your ear symptoms, do you have any of these throat or digestion symptoms? Select all that apply.    None of these   Not selected:    Burning feeling in your chest (heartburn)    Swallowed food or liquids getting stuck and coming back up    Feeling like there's a lump in your throat    Hoarse voice    Difficulty swallowing   Along with your ear symptoms, have you had a headache? Select one.    No   Not selected:    Yes, and the pain is mild to moderate    Yes, and the pain is severe    Yes, and the pain is unbearable    Yes, and it's the worst headache of my life   Have you had any of these vision changes? Select all that  "apply.    None of these   Not selected:    Blurry vision    Double vision    I can't see at all from one or both eyes   Along with your ear symptoms, have you felt dizzy or had vertigo? This includes feeling like you're spinning, swaying, or tilting; feeling light-headed; or feeling like the room is moving around you. Select one.    No   Not selected:    Yes   Do your symptoms include vomiting? Select one.    No   Not selected:    Yes   Can you move your chin toward your chest? Select one.    Yes   Not selected:    No, my neck is too stiff   For the following test, let's call the ear with hearing loss your \"bad\" ear and the other one your \"good\" ear. Please hum. In which ear do you hear the humming louder? Select one.    I'm not sure   Not selected:    In my \"bad\" ear    In my \"good\" ear   Does your ear pain or discomfort get worse if you do either of these: 1. Pull the cartilage part of your ear up and back 2. Push on your tragus (the flesh in front of your ear opening)    No   Not selected:    Yes   Take a moment and lie down. Does your ear pain or discomfort feel worse when you lie down? Select one.    Yes   Not selected:    No   Right before your symptoms started, did you put anything sharp or pointed into your ear canal? Select one.    No   Not selected:    Yes   Is it possible that you have something stuck in your ear canal? Examples include a bead, button, rock, or part of an earbud. Select one.    No   Not selected:    Yes   Right before your ear pain began, did you have a severe head or ear injury? Examples include: - A blow to your head or ear - Hitting your head or ear on a hard surface - Falling on your ear while skateboarding or skiing - A motor vehicle accident - A sports injury, such as in wrestling - Penetrating trauma, such as a knife wound Select one.    No   Not selected:    Yes   In the week before your symptoms started, did any of these apply to you? Select all that apply.    None of the above   " Not selected:    Air travel    Scuba diving    Hiking or driving in the mountains    Exposed to a loud blast or explosion   In the few weeks before your symptoms started, did you go swimming or get water in one or both ears? Select one.    No   Not selected:    Yes   Have you recently been exposed to more smoke or air pollution than usual? Select all that apply.    None of the above   Not selected:    Yes, tobacco smoke    Yes, smoke from a fire or wood-burning stove    Yes, air pollution   Do you regularly use any of these items? Select all that apply.    Cotton swabs to clean your ears   Not selected:    Hearing aids    Earbuds or in-ear headphones    Swim caps    Earwax removal devices, such as an irrigation kit    None of the above   Are you being treated by an Ear, Nose and Throat (ENT) doctor for an ear condition? Select one.    No   Not selected:    Yes   Do you have ear tubes? Some other names for ear tubes are tympanostomy tubes, ventilation tubes, or pressure equalization (PE) tubes. Select one.    Yes, in my right ear only   Not selected:    Yes, in my left ear only    Yes, in both ears    No, but I've had them in the past    No   In the past 5 years, have you had mastoid surgery or ear surgery (not including ear tube placement or removal)? Select one.    No   Not selected:    Yes   When was the last time you were treated with antibiotics for an ear infection? This includes both oral antibiotics and antibiotic ear drops. Select one.    More than a year ago   Not selected:    Never    Within the last month    1 to 3 months ago    4 months to a year ago    I'm not sure   Do you have any of these conditions that can affect the immune system? Scroll to see all options. Select all that apply.    None of these   Not selected:    History of bone marrow transplant    Chronic kidney disease    Chronic liver disease (including cirrhosis)    HIV/AIDS    Inflammatory bowel disease (Crohn's disease or ulcerative  colitis)    Lupus    Moderate to severe plaque psoriasis    Multiple sclerosis    Rheumatoid arthritis    Sickle cell anemia    Alpha or beta thalassemia    History of solid organ transplant (kidney, liver, or heart)    History of spleen removal    An autoimmune disorder not listed here    A condition requiring treatment with long-term use of oral steroids (such as prednisone, prednisolone, or dexamethasone)   Have you ever been diagnosed with cancer? Select one.    No   Not selected:    Yes, I have cancer now    Yes, but I'm in remission   Have you gone through menopause? Select one.    I'm going through it now   Not selected:    Yes    No   The next few questions help us recommend the best treatment for you. Have you used any of these non-prescription medications for your ear symptoms? Scroll to see all options. Select all that apply.    Acetaminophen (Tylenol)    Diphenhydramine (Benadryl)   Not selected:    Carbamide peroxide otic (Auro, Debrox)    Cetirizine (Zyrtec)    Fexofenadine (Allegra)    Fluticasone (Flonase)    Ibuprofen (Advil, Motrin, Midol)    Naproxen (Aleve)    Isopropyl alcohol in glycerin ear drops (Swim Ear drops)    Loratadine (Alavert, Claritin)    Oxymetazoline (Afrin)    Phenylephrine (Sudafed)    Triamcinolone (Nasacort)    None of these   Are you taking any other medications or supplements? On the next screen, you need to list all vitamins, supplements, non-prescription medications (such as aspirin or Aleve), and prescription medications that you're taking. Select one.    Yes   Not selected:    Yes, but I'm not sure what they are    No   Have you ever had an allergic or bad reaction to any medication? Select one.    No   Not selected:    Yes   Are you currently being treated for any of these conditions? Scroll to see all options. Select all that apply.    None of the above   Not selected:    Arrhythmia    Congestive heart failure    Recent heart attack    Heart block    Blockage or  narrowing of the blood vessels of the heart    Hypertension    Hyperthyroidism    Mononucleosis    Myasthenia gravis   Have you ever had jaundice or liver problems as a result of taking amoxicillin-clavulanate (Augmentin)? Select all that apply.    No, not that I know of   Not selected:    Yes, jaundice    Yes, liver problems   Have you ever had jaundice or liver problems as a result of taking azithromycin (Zithromax, Zmax)? Select all that apply.    No, not that I know of   Not selected:    Yes, jaundice    Yes, liver problems   Have you had any of these conditions? Scroll to see all options. Select all that apply.    None of the above   Not selected:    Aspirin triad (also known as Samter's triad or ASA triad)    Asthma or hives from taking aspirin or other NSAIDs, such as ibuprofen or naproxen    Coagulation disorder    Difficulty urinating or completely emptying your bladder    Electrolyte abnormalities    G6PD deficiency    Gastrointestinal (GI) bleeding    Influenza, chickenpox, or a viral infection with fever    Recent coronary artery bypass graft (CABG) surgery   Have you taken any monoamine oxidase inhibitor (MAOI) medications within the last 14 days? Examples include rasagiline (Azilect), selegiline (Eldepryl, Zelapar), isocarboxazid (Marplan), phenelzine (Nardil), and tranylcypromine (Parnate). Select one.    No   Not selected:    Yes   Do you need a doctor's note? A doctor's note confirms that you received care today and states when you can return to school or work. It does not contain information about your diagnosis or treatment plan. Your provider will make the final decision on whether to give you a doctor's note. Doctor's notes CANNOT be backdated. Select one.    No   Not selected:    Today only (1 day)    Today and tomorrow (2 days)    3 days   Is there anything else you'd like to tell us about your symptoms?   The patient did not enter any additional information.   ----------   Medical history    Medical history data does not currently exist for this patient.

## 2022-07-19 NOTE — EXTERNAL PATIENT INSTRUCTIONS
Note   MsDarcy Mariely, I am giving you an antibiotic ear drop that has a steroid in it to help with possible swelling. It is difficult to ascertain the problem. If you are not better in 2-3 days I recommend seeing your Primary provider for an in person exam. You could have ear wax build up or an inner ear infection. Marsha Munoz DOROTHY   Diagnosis   Otitis externa (swimmer's ear)   My name is Marsha Munoz. I'm a healthcare provider at [facility].   I've reviewed your interview, and I believe you have otitis externa, often called swimmer's ear. Swimmer's ear is an infection or inflammation of the outer ear canal.   Medications   Your pharmacy   [facility] [address] [address] [phone]     Prescription   Ibuprofen (800mg): Take 1 tablet by mouth every 8 hours for up to 10 days as needed for any fever, pain, or discomfort associated with your condition. Take with food to avoid upset stomach. Do not exceed 3200 mg (4 tablets) in a 24-hour period.   Ciprofloxacin/dexamethasone otic suspension (0.3%/0.1%): Put 4 drops in the affected ear twice a day for 7 days. After placing drops in ear, lie down with affected ear upward for 1 minute. This medication is an antibiotic. Take it exactly as directed. You must finish the entire course of medication, even if you feel better after the first few days of treatment.    I've given you a prescription dose of ibuprofen. If it's more affordable or convenient, you may use the equivalent amount of non-prescription ibuprofen. For example, instead of taking one 800 mg ibuprofen tablet, you may take four 200 mg ibuprofen tablets. Don't take ibuprofen with other non-steroidal anti-inflammatory drugs (NSAIDs), including naproxen or aspirin. Taking these medications together can increase the risk of serious side effects.   About your diagnosis   Otitis externa is an infection of the outer ear canal. It happens when bacteria enter the skin of the ear  canal after it's been scratched or irritated. Common causes of otitis externa include:    Aggressive cleaning of the ears to remove earwax. Earwax protects the ear canal by keeping out dirt, dust, and bacteria.    Inserting cotton swabs or anything else into the ear canal. This can injure the skin and allow bacteria to enter. Cotton swabs can also push earwax further into the ear canal. This can cause earwax to build up in the ear canal and cause symptoms.    Getting water in the ears. This removes some of the earwax and softens the skin, making injury and infection more likely.    Frequent use of earbuds, earplugs, headphones, hearing aids, or earwax removal kits. These can injure the ear canal and introduce bacteria, which can lead to infection.   What to expect   If you follow this treatment plan, you should feel better within 1 to 2 days.   When to seek care   Call us at [phone]   with any sudden or unexpected symptoms.    Symptoms that don't get better after 2 to 3 days.    Symptoms that get worse despite treatment.    Pain, tenderness, redness, or swelling on the bony part behind your ear along with a fever.    Fever of 100.4F or higher.    Worsening pain in your ear or in the bones around your ear.    Your hearing loss gets worse or doesn't improve.    New ear drainage that brings sudden pain relief.    Bloody ear drainage.    Foul-smelling ear drainage.   Other treatment    Avoid swimming and scuba diving during treatment and for at least 7 days after treatment.    Keep your ears dry at all times, even in the shower. Before showering, put a cotton ball with petroleum jelly in your affected ear.    Don't put anything else in your ear, including Q-tips, earbuds, and earplugs.    Try to avoid any kind of smoke. Smoke can irritate the ear and worsen infections.   Prevention   Always dry your ears after swimming. Shake your ears dry, or tip your head to the side to let water run out. Use a towel to  "gently dry your outer ear.   Avoid inserting cotton swabs or any other objects in your ear canal. These can injure your ear canal and increase the risk of infection. Cotton swabs and other items can also push earwax deeper into your ear canal. This makes it easier for water to get trapped inside your ear.   The common saying, \"Don't put anything smaller than your elbow in your ear\" is a good rule to follow.   Your provider   Your diagnosis was provided by Marsha Munoz, a member of your trusted care team at [facility].   If you have any questions, call us at [phone]  .    "

## 2022-10-02 ENCOUNTER — E-VISIT (OUTPATIENT)
Dept: FAMILY MEDICINE CLINIC | Facility: TELEHEALTH | Age: 47
End: 2022-10-02

## 2022-10-02 ENCOUNTER — HOSPITAL ENCOUNTER (EMERGENCY)
Facility: HOSPITAL | Age: 47
Discharge: HOME | End: 2022-10-02
Attending: EMERGENCY MEDICINE | Admitting: EMERGENCY MEDICINE

## 2022-10-02 ENCOUNTER — TELEMEDICINE (OUTPATIENT)
Dept: FAMILY MEDICINE CLINIC | Facility: TELEHEALTH | Age: 47
End: 2022-10-02

## 2022-10-02 ENCOUNTER — APPOINTMENT (OUTPATIENT)
Dept: CT IMAGING | Facility: HOSPITAL | Age: 47
End: 2022-10-02

## 2022-10-02 VITALS
HEART RATE: 84 BPM | BODY MASS INDEX: 44.71 KG/M2 | RESPIRATION RATE: 20 BRPM | DIASTOLIC BLOOD PRESSURE: 60 MMHG | TEMPERATURE: 98.4 F | HEIGHT: 64 IN | SYSTOLIC BLOOD PRESSURE: 124 MMHG | OXYGEN SATURATION: 95 % | WEIGHT: 261.91 LBS

## 2022-10-02 DIAGNOSIS — N20.1 RIGHT URETERAL STONE: Primary | ICD-10-CM

## 2022-10-02 DIAGNOSIS — R10.9 RIGHT FLANK PAIN: Primary | ICD-10-CM

## 2022-10-02 DIAGNOSIS — R30.0 DYSURIA: ICD-10-CM

## 2022-10-02 LAB
ALBUMIN SERPL-MCNC: 4.9 G/DL (ref 3.5–5.2)
ALBUMIN/GLOB SERPL: 1.5 G/DL
ALP SERPL-CCNC: 95 U/L (ref 39–117)
ALT SERPL W P-5'-P-CCNC: 15 U/L (ref 1–33)
ANION GAP SERPL CALCULATED.3IONS-SCNC: 13.3 MMOL/L (ref 5–15)
AST SERPL-CCNC: 17 U/L (ref 1–32)
BACTERIA UR QL AUTO: ABNORMAL /HPF
BASOPHILS # BLD AUTO: 0.02 10*3/MM3 (ref 0–0.2)
BASOPHILS NFR BLD AUTO: 0.2 % (ref 0–1.5)
BILIRUB SERPL-MCNC: 0.6 MG/DL (ref 0–1.2)
BILIRUB UR QL STRIP: NEGATIVE
BUN SERPL-MCNC: 10 MG/DL (ref 6–20)
BUN/CREAT SERPL: 9.4 (ref 7–25)
CALCIUM SPEC-SCNC: 9.9 MG/DL (ref 8.6–10.5)
CHLORIDE SERPL-SCNC: 107 MMOL/L (ref 98–107)
CLARITY UR: ABNORMAL
CO2 SERPL-SCNC: 22.7 MMOL/L (ref 22–29)
COLOR UR: YELLOW
CREAT SERPL-MCNC: 1.06 MG/DL (ref 0.57–1)
DEPRECATED RDW RBC AUTO: 44.8 FL (ref 37–54)
EGFRCR SERPLBLD CKD-EPI 2021: 64.5 ML/MIN/1.73
EOSINOPHIL # BLD AUTO: 0.02 10*3/MM3 (ref 0–0.4)
EOSINOPHIL NFR BLD AUTO: 0.2 % (ref 0.3–6.2)
ERYTHROCYTE [DISTWIDTH] IN BLOOD BY AUTOMATED COUNT: 13.1 % (ref 12.3–15.4)
GLOBULIN UR ELPH-MCNC: 3.3 GM/DL
GLUCOSE SERPL-MCNC: 141 MG/DL (ref 65–99)
GLUCOSE UR STRIP-MCNC: NEGATIVE MG/DL
HCG INTACT+B SERPL-ACNC: <0.5 MIU/ML
HCT VFR BLD AUTO: 37.7 % (ref 34–46.6)
HGB BLD-MCNC: 13.3 G/DL (ref 12–15.9)
HGB UR QL STRIP.AUTO: ABNORMAL
HOLD SPECIMEN: NORMAL
HOLD SPECIMEN: NORMAL
HYALINE CASTS UR QL AUTO: ABNORMAL /LPF
IMM GRANULOCYTES # BLD AUTO: 0.04 10*3/MM3 (ref 0–0.05)
IMM GRANULOCYTES NFR BLD AUTO: 0.3 % (ref 0–0.5)
KETONES UR QL STRIP: ABNORMAL
LEUKOCYTE ESTERASE UR QL STRIP.AUTO: NEGATIVE
LIPASE SERPL-CCNC: 25 U/L (ref 13–60)
LYMPHOCYTES # BLD AUTO: 1.4 10*3/MM3 (ref 0.7–3.1)
LYMPHOCYTES NFR BLD AUTO: 11.4 % (ref 19.6–45.3)
MCH RBC QN AUTO: 32.8 PG (ref 26.6–33)
MCHC RBC AUTO-ENTMCNC: 35.3 G/DL (ref 31.5–35.7)
MCV RBC AUTO: 93.1 FL (ref 79–97)
MONOCYTES # BLD AUTO: 0.49 10*3/MM3 (ref 0.1–0.9)
MONOCYTES NFR BLD AUTO: 4 % (ref 5–12)
NEUTROPHILS NFR BLD AUTO: 10.28 10*3/MM3 (ref 1.7–7)
NEUTROPHILS NFR BLD AUTO: 83.9 % (ref 42.7–76)
NITRITE UR QL STRIP: NEGATIVE
NRBC BLD AUTO-RTO: 0 /100 WBC (ref 0–0.2)
PH UR STRIP.AUTO: 5.5 [PH] (ref 5–8)
PLATELET # BLD AUTO: 378 10*3/MM3 (ref 140–450)
PMV BLD AUTO: 9.4 FL (ref 6–12)
POTASSIUM SERPL-SCNC: 4.6 MMOL/L (ref 3.5–5.2)
PROT SERPL-MCNC: 8.2 G/DL (ref 6–8.5)
PROT UR QL STRIP: ABNORMAL
RBC # BLD AUTO: 4.05 10*6/MM3 (ref 3.77–5.28)
RBC # UR STRIP: ABNORMAL /HPF
REF LAB TEST METHOD: ABNORMAL
SODIUM SERPL-SCNC: 143 MMOL/L (ref 136–145)
SP GR UR STRIP: 1.02 (ref 1–1.03)
SQUAMOUS #/AREA URNS HPF: ABNORMAL /HPF
UROBILINOGEN UR QL STRIP: ABNORMAL
WBC # UR STRIP: ABNORMAL /HPF
WBC NRBC COR # BLD: 12.25 10*3/MM3 (ref 3.4–10.8)
WHOLE BLOOD HOLD COAG: NORMAL
WHOLE BLOOD HOLD SPECIMEN: NORMAL

## 2022-10-02 PROCEDURE — 25010000002 ONDANSETRON PER 1 MG: Performed by: EMERGENCY MEDICINE

## 2022-10-02 PROCEDURE — 96375 TX/PRO/DX INJ NEW DRUG ADDON: CPT

## 2022-10-02 PROCEDURE — 99283 EMERGENCY DEPT VISIT LOW MDM: CPT

## 2022-10-02 PROCEDURE — 80053 COMPREHEN METABOLIC PANEL: CPT | Performed by: EMERGENCY MEDICINE

## 2022-10-02 PROCEDURE — 83690 ASSAY OF LIPASE: CPT

## 2022-10-02 PROCEDURE — 81001 URINALYSIS AUTO W/SCOPE: CPT | Performed by: EMERGENCY MEDICINE

## 2022-10-02 PROCEDURE — 25010000002 KETOROLAC TROMETHAMINE PER 15 MG: Performed by: EMERGENCY MEDICINE

## 2022-10-02 PROCEDURE — 74176 CT ABD & PELVIS W/O CONTRAST: CPT

## 2022-10-02 PROCEDURE — 85025 COMPLETE CBC W/AUTO DIFF WBC: CPT

## 2022-10-02 PROCEDURE — 84702 CHORIONIC GONADOTROPIN TEST: CPT

## 2022-10-02 PROCEDURE — 96374 THER/PROPH/DIAG INJ IV PUSH: CPT

## 2022-10-02 RX ORDER — ONDANSETRON 2 MG/ML
4 INJECTION INTRAMUSCULAR; INTRAVENOUS ONCE
Status: COMPLETED | OUTPATIENT
Start: 2022-10-02 | End: 2022-10-02

## 2022-10-02 RX ORDER — OXYCODONE HYDROCHLORIDE AND ACETAMINOPHEN 5; 325 MG/1; MG/1
1 TABLET ORAL EVERY 4 HOURS PRN
Qty: 12 TABLET | Refills: 0 | Status: SHIPPED | OUTPATIENT
Start: 2022-10-02

## 2022-10-02 RX ORDER — ONDANSETRON 4 MG/1
4 TABLET, ORALLY DISINTEGRATING ORAL EVERY 6 HOURS PRN
Qty: 15 TABLET | Refills: 0 | Status: SHIPPED | OUTPATIENT
Start: 2022-10-02

## 2022-10-02 RX ORDER — KETOROLAC TROMETHAMINE 15 MG/ML
15 INJECTION, SOLUTION INTRAMUSCULAR; INTRAVENOUS ONCE
Status: COMPLETED | OUTPATIENT
Start: 2022-10-02 | End: 2022-10-02

## 2022-10-02 RX ORDER — TAMSULOSIN HYDROCHLORIDE 0.4 MG/1
1 CAPSULE ORAL DAILY
Qty: 10 CAPSULE | Refills: 0 | Status: SHIPPED | OUTPATIENT
Start: 2022-10-02

## 2022-10-02 RX ORDER — SODIUM CHLORIDE 0.9 % (FLUSH) 0.9 %
10 SYRINGE (ML) INJECTION AS NEEDED
Status: DISCONTINUED | OUTPATIENT
Start: 2022-10-02 | End: 2022-10-02 | Stop reason: HOSPADM

## 2022-10-02 RX ORDER — OXYCODONE HYDROCHLORIDE AND ACETAMINOPHEN 5; 325 MG/1; MG/1
2 TABLET ORAL ONCE
Status: COMPLETED | OUTPATIENT
Start: 2022-10-02 | End: 2022-10-02

## 2022-10-02 RX ADMIN — KETOROLAC TROMETHAMINE 15 MG: 15 INJECTION, SOLUTION INTRAMUSCULAR; INTRAVENOUS at 18:12

## 2022-10-02 RX ADMIN — ONDANSETRON 4 MG: 2 INJECTION INTRAMUSCULAR; INTRAVENOUS at 17:47

## 2022-10-02 RX ADMIN — OXYCODONE HYDROCHLORIDE AND ACETAMINOPHEN 2 TABLET: 5; 325 TABLET ORAL at 18:12

## 2022-10-02 NOTE — PROGRESS NOTES
Subjective   Ada Schuster is a 47 y.o. female.     History of Present Illness  She is having pain in her right flank in the back and front, pressure, burning and pain with urination, and vomiting. Her symptoms started this morning. She rates her pain an 8/10. Has a pmh of kidney stones.       The following portions of the patient's history were reviewed and updated as appropriate: allergies, current medications, past family history, past medical history, past social history, past surgical history, and problem list.    Review of Systems   Constitutional: Negative for fever.   Gastrointestinal: Positive for nausea and vomiting.   Genitourinary: Positive for dysuria, flank pain and pelvic pain.   Musculoskeletal: Positive for back pain.       Objective   Physical Exam  Constitutional:       General: She is not in acute distress.     Appearance: She is well-developed. She is not diaphoretic.   Pulmonary:      Effort: Pulmonary effort is normal.   Neurological:      Mental Status: She is alert and oriented to person, place, and time.   Psychiatric:         Behavior: Behavior normal.           Assessment & Plan   Diagnoses and all orders for this visit:    1. Right flank pain (Primary)    2. Dysuria          I advised her to go to the ER for evaluation- possible kidney stone or pyelonephritis. Patient expressed understanding and agrees to this plan.       The use of a video visit has been reviewed with the patient and verbal informed consent has been obtained. Myself and Ada Schuster participated in this visit. The patient is located in [place], [place]. I am located in [place], [place]. Mychart and Zoom were utilized. I spent 10 minutes in the patient's chart for this visit.

## 2022-10-02 NOTE — E-VISIT ESCALATED
Patient escalated   Provider Hayley Agrawal chose to escalate patient to another level of care because: Patient needs a lab test   Patient was sent the following message:   Based on the information you've provided us, you need to take another step to get care.   What to do now:   Setup a video visit   Please, schedule your video visit   Video visit     You won't be charged for your eVisit. If you paid with a credit card, the charge will be reversed.   Chief Complaint: Bladder infection (UTI)   Patient introduction   Patient is 47-year-old female.   Patient has had dysuria, frequent urination, and urinary urgency for less than 24 hours.   Urine is yellow with no unusual odor.   Warning. The following may warrant further investigation:    Severe abdominal pain that prevents patient from performing daily tasks or getting comfortable    Pain on right flank with nausea, vomiting, suprapubic pain, pressure, or discomfort, and back pain (but no fever)    History of pyelonephritis within the last year    History of kidney stones within the last year   Patient did not request an excuse note.   General presentation   Patient has not had a fever. Patient has nausea and vomiting.   Severe abdominal or pelvic pain that prevents them from performing daily tasks or getting comfortable.   Moderate back pain.   Pain in right flank. Difficulty starting, stopping, or delaying urination.   Not taking OTC acetaminophen, ibuprofen, or phenazopyridine for current symptoms.   Previous history of UTI. Current symptoms feel mostly the same as previous UTIs. Received treatment for UTI 1 to 3 times in last year. Patient's last use of antibiotics for UTI was over 1 month ago.   History of pyelonephritis within the last year. History of kidney stones within the last year.   Tried ciprofloxacin for their past UTIs. They found it helpful.   Previously developed yeast infections as a result of taking antibiotics for past UTIs.   No sexual  intercourse in the past week. Does not use diaphragm. Had unprotected sexual intercourse with a new partner in the last 2 weeks. Has not been exposed to sexually transmitted infections in the last month.   Patient is not being treated for diabetes mellitus.   Review of red flags/alarm symptoms:    No recent hospitalizations or nursing home care (last 3 months)    No history of renal failure    No recent history of urologic instrumentation    No anatomic abnormalities of the urinary tract    No abnormal vaginal discharge    No visible vaginal sores    No pain with sexual intercourse    No abnormal vaginal bleeding or spotting   Pregnancy/menstrual status/breastfeeding:   Patient is menopausal.   Current medications   Currently taking Bupropion Hydrochloride 150 MG [Wellbutrin].   Medication allergies   None.   Medication contraindication review   Not taking ACE inhibitors and ARBs.   No history of anaphylactic reaction to beta-lactams; folate deficiency; G6PD deficiency; arrhythmia; coronary artery disease; megaloblastic anemia; mononucleosis; myasthenia gravis; cholestatic jaundice; oliguria/anuria; and TMP/SMX-associated thrombocytopenia.   No known history of amoxicillin-clavulanate-associated cholestatic jaundice or nitrofurantoin-associated cholestatic jaundice.   Past medical history   Immune conditions: No immunocompromising conditions. No history of cancer.   Assessment:   Patient determined to need a level of care not appropriate to be delivered through eVisit.   Plan:   Patient informed of need to seek in-person care   ----------   Electronically signed by DOROTHY Rockwell on 2022-10-02 at 14:28PM   ----------   Patient Interview Transcript:   Knowing about your anatomy is important for diagnosing and treating UTIs. The gender we have on file for you is female, but we realize that this might not tell the whole story. Would you like to tell us more about your anatomy?    No   Not selected:    Yes   Which  of these symptoms do you have? Select all that apply.    Pain or burning while urinating    Frequent urination    Sudden urge to urinate and it's hard to hold the urine in   How long have you had these symptoms? Select one.    Less than 24 hours   Not selected:    1 to 3 days    4 to 6 days    7 to 10 days    More than 10 days   Since your current symptoms started, has it been difficult to start, stop, or delay urination? Select one.    Yes   Not selected:    No   What color is your urine? Select one.    Yellow   Not selected:    Clear    Cloudy    Pink or red   Does your urine smell strange (like ammonia) or stronger than usual? Select one.    No   Not selected:    Yes   Do you also have any of these symptoms? Select all that apply.    Nausea    Vomiting    Pain, pressure, or discomfort in the lower abdomen    Back pain   Not selected:    Fever    No   How would you describe your lower abdominal pain, pressure, or discomfort? Select one.    Severe; I can't get comfortable, and it stops me from doing daily tasks   Not selected:    Mild; I only notice it when I pay attention to it    Moderate; it's uncomfortable and gets in the way of doing daily tasks   How would you describe your back pain? Select one.    Moderate pain that gets in the way of my daily tasks   Not selected:    Mild, achy pain    Severe, sharp pain that keeps me from my daily tasks   Do you have any flank pain? The flank is the side of the body between the ribs and the hips.    Yes, in my right flank   Not selected:    Yes, in my left flank    Yes, in both my left and right flanks    No   Do you have any of these vaginal symptoms? Select all that apply.    No   Not selected:    Abnormal vaginal itching    Unscheduled or abnormal vaginal bleeding or spotting    Pain during sex    Visible sores on the vagina    Abnormal vaginal discharge   In the past 2 weeks, have you had a medical device or instrument placed in your urinary tract? Examples include  catheters, stents, and nephrostomy tubes. Select one.    No   Not selected:    Yes   Have you recently been hospitalized or been a resident of a nursing home or other long-term care facility? This doesn't include emergency room (ER) visits. Select one.    No   Not selected:    Yes, within the last 2 weeks    Yes, within the last 3 months   Have you ever had severe problems with your kidneys, such as kidney failure? Select one.    No   Not selected:    Yes   Kidney stones    Within the last year   Not selected:    More than a year ago    No   Kidney infection (pyelonephritis)    Within the last year   Not selected:    More than a year ago    No   Have you ever been diagnosed with any of these? Select all that apply.    No   Not selected:    Urinary reflux    Bladder diverticula    Single (or horseshoe) kidney    Duplicated urethra   Have you recently held your urine for a long time after you felt the urge to go? Select one.    No   Not selected:    Yes   Have you recently avoided eating or drinking so you wouldn't have the urge to urinate as often? Select one.    No   Not selected:    Yes   Do you use a diaphragm? Select one.    No   Not selected:    Yes   Have you gone through menopause? Select one.    I'm going through it now   Not selected:    Yes    No   Have you had sexual intercourse in the past week? Recent sexual intercourse is a risk factor for urinary tract infections. Select one.    No   Not selected:    Yes   Have you been exposed to a sexually transmitted infection (STI or STD) in the last month? Examples include chlamydia, gonorrhea, trichomoniasis, and herpes. Select one.    No, not that I know of   Not selected:    Yes   Have you had unprotected sexual intercourse with a new partner in the last 2 weeks? Select one.    Yes   Not selected:    No   Have you traveled to any of these countries within the last 3 months? Recent travel to these countries may affect which medication we recommend for your  symptoms. Select all that apply.    None of these   Not selected:    [place]    [place]    [place]    [place]   Have you taken any of these medications for your current symptoms? Select any that may apply.    No   Not selected:    Acetaminophen (Tylenol)    Ibuprofen (Advil, Motrin)    Phenazopyridine (Azo, Baridium, Pyridium, Uricalm, Uristat)   Have you ever had a urinary tract infection (UTI)? A UTI is often called a bladder infection or acute cystitis. Select one.    Yes   Not selected:    No, not that I know of   How much do your current symptoms feel like past UTIs? Select one.    Mostly the same   Not selected:    Exactly the same    Somewhat the same    Totally different   In the past year, how many times have you taken antibiotics for a UTI? Select one.    1 to 3   Not selected:    0    4 or more   When did you last take antibiotics for a UTI? Select one.    More than 1 month ago   Not selected:    Less than 1 month ago   Which of these antibiotics have you taken for UTIs in the past? Select all that apply.    Ciprofloxacin (Cipro)   Not selected:    Amoxicillin-clavulanate (Augmentin)    Cefdinir    Cefuroxime    Cephalexin (Keflex)    Fosfomycin (Monurol)    Nitrofurantoin (Macrobid, Macrodantin)    Sulfamethoxazole/trimethoprim, also known as TMP/SMX (Bactrim, Bactrim DS)    Trimethoprim (Primsol)    Other (specify)    I'm not sure   Did ciprofloxacin work well for you? Select one.    Yes   Not selected:    No   You mentioned using ciprofloxacin (Cipro) in the past. Have you used this antibiotic in the last 3 months? Select one.    No   Not selected:    Yes   Have you ever developed a yeast infection as a result of taking antibiotics? Select one.    Yes   Not selected:    No, not that I know of   UTIs may be more serious when other factors are present. Let's address those now. Are you being treated for type 1 or type 2 diabetes? Select one.    No   Not selected:    Yes   Do you have any of these conditions  that can affect the immune system? Scroll to see all options. Select all that apply.    None of these   Not selected:    History of bone marrow transplant    Chronic kidney disease    Chronic liver disease (including cirrhosis)    HIV/AIDS    Inflammatory bowel disease (Crohn's disease or ulcerative colitis)    Lupus    Moderate to severe plaque psoriasis    Multiple sclerosis    Rheumatoid arthritis    Sickle cell anemia    Alpha or beta thalassemia    History of solid organ transplant (kidney, liver, or heart)    History of spleen removal    An autoimmune disorder not listed here    A condition requiring treatment with long-term use of oral steroids (such as prednisone, prednisolone, or dexamethasone)   Have you ever been diagnosed with cancer? Select one.    No   Not selected:    Yes, I have cancer now    Yes, but I'm in remission   These last few questions will help us create the right treatment plan for you. Are you being treated for any of these conditions? Select all that apply.    No   Not selected:    Sophie-Danlos syndrome    Folate deficiency    G6PD deficiency    High blood pressure    History of aortic aneurysm or dissection    Marfan syndrome    Megaloblastic anemia    Mono (mononucleosis)    Myasthenia gravis    Oliguria or anuria    Peripheral vascular disease   Have you ever had jaundice as a result of taking amoxicillin-clavulanate (Augmentin) or nitrofurantoin (Macrobid)? Select all that apply.    No   Not selected:    Yes, from amoxicillin-clavulanate (Augmentin)    Yes, from nitrofurantoin (Macrobid, Macrodantin)   Are you taking any of these medications? Select all that apply.    No   Not selected:    An ACE inhibitor such as lisinopril, enalapril, captopril, or benazepril    An angiotensin II receptor blocker (ARB) such as candesartan, irbesartan, losartan, or valsartan   Are you taking any other medications, vitamins, or supplements? Select one.    Yes   Not selected:    No   Have you ever  had an allergic or bad reaction to any medication? Select one.    No   Not selected:    Yes   Do you need a doctor's note? A doctor's note confirms that you received care today and states when you can return to school or work. It does not contain information about your diagnosis or treatment plan. Your provider will make the final decision on whether to give you a doctor's note. Doctor's notes CANNOT be backdated. Select one.    No   Not selected:    Today only (1 day)   Is there anything else you'd like to tell us about your symptoms?   The patient did not enter any additional information.   ----------   Medical history   Medical history data does not currently exist for this patient.

## 2022-10-02 NOTE — ED PROVIDER NOTES
Time: 4:16 PM EDT  Chief Complaint:   Chief Complaint   Patient presents with   • Abdominal Pain   • Flank Pain           History of Present Illness:  Patient is a 47 y.o. year old female who presents to the emergency department with R flank pain since this morning about 10 AM. Hx of kidney stones.  Admits to nausea and vomiting.  Pain is sharp, waxing and waning but currently constant in the ED.  She describes it as a severe pain with no aggravating relieving factors.  Vomited 7-8 times just secondary to pain alone.  Pain is in the right back/flank area and radiates to the right lower quadrant.  Feels like past kidney stones.      HPI        Patient Care Team  Primary Care Provider: January Short APRN    Past Medical History:     No Known Allergies  Past Medical History:   Diagnosis Date   • Allergies    • Anemia    • Anxiety    • Cataract    • Cervical pain (neck) 10/28/2020   • Depression    • Diabetes (HCC)    • Gall stones    • History of hyperlipidemia 10/28/2020   • Hyperlipidemia    • Hypertension    • Kidney stones    • Migraines    • Radiculopathy of arm 10/28/2020   • Skin lesion of breast 10/28/2020    Right    • Trapezius muscle strain 2020     Past Surgical History:   Procedure Laterality Date   • APPENDECTOMY     •  SECTION     • CHOLECYSTECTOMY     • EAR TUBES     • INNER EAR SURGERY      Inner ear reconstruction   • LAPAROSCOPIC GASTRIC BANDING       Family History   Problem Relation Age of Onset   • Diabetes Father    • Diabetes Paternal Grandmother    • Heart disease Other        Home Medications:  Prior to Admission medications    Medication Sig Start Date End Date Taking? Authorizing Provider   acetaminophen (TYLENOL) 500 MG tablet acetaminophen 500 mg oral tablet take 2 tablets (1,000 mg) by oral route every 6 hours as needed   Active    Provider, MD María   buPROPion SR (WELLBUTRIN SR) 150 MG 12 hr tablet Take 1 tablet by mouth 2 (Two) Times a Day.  "3/30/22   January Short APRN   ibuprofen (ADVIL,MOTRIN) 400 MG tablet ibuprofen 400 mg oral tablet take 1 tablet (400 mg) by oral route 3 times per day with food   Active    ProviderMaría MD   Melatonin 10 MG tablet melatonin 10 mg oral tablet take 1 tablet by oral route daily   Active    ProviderMaría MD   Methylcellulose, Laxative, (FIBER THERAPY PO) Take  by mouth.    ProviderMaría MD   multivitamin with minerals tablet tablet Take 1 tablet by mouth Daily.    ProviderMaría MD        Social History:   Social History     Tobacco Use   • Smoking status: Current Every Day Smoker     Packs/day: 0.25     Years: 6.00     Pack years: 1.50   • Smokeless tobacco: Never Used   • Tobacco comment: Started smoking at age 16 smoked 5 cigarette(s) per day   Vaping Use   • Vaping Use: Never used   Substance Use Topics   • Alcohol use: Yes     Comment: Rarely          Review of Systems:  Review of Systems   Constitutional: Negative for chills and fever.   HENT: Negative for congestion, rhinorrhea and sore throat.    Eyes: Negative for pain and visual disturbance.   Respiratory: Negative for apnea, cough, chest tightness and shortness of breath.    Cardiovascular: Negative for chest pain and palpitations.   Gastrointestinal: Positive for abdominal pain, nausea and vomiting. Negative for diarrhea.   Genitourinary: Positive for dysuria, flank pain and urgency. Negative for difficulty urinating and hematuria.   Musculoskeletal: Negative for joint swelling and myalgias.   Skin: Negative for color change.   Neurological: Negative for seizures and headaches.   Psychiatric/Behavioral: Negative.    All other systems reviewed and are negative.       Physical Exam:  /63   Pulse 69   Temp 98.4 °F (36.9 °C) (Oral)   Resp 20   Ht 162.6 cm (64\")   Wt 119 kg (261 lb 14.5 oz)   SpO2 94%   BMI 44.96 kg/m²     Physical Exam  Vitals and nursing note reviewed.   Constitutional:       Appearance: " Normal appearance.   HENT:      Head: Normocephalic and atraumatic.      Nose: Nose normal.      Mouth/Throat:      Mouth: Mucous membranes are moist.   Eyes:      Extraocular Movements: Extraocular movements intact.   Cardiovascular:      Rate and Rhythm: Normal rate and regular rhythm.      Heart sounds: Normal heart sounds.   Pulmonary:      Effort: Pulmonary effort is normal.      Breath sounds: Normal breath sounds.   Abdominal:      General: Bowel sounds are normal. There is no distension.      Palpations: Abdomen is soft.      Tenderness: There is abdominal tenderness in the right lower quadrant. There is no guarding or rebound.   Musculoskeletal:         General: No swelling. Normal range of motion.      Cervical back: Normal range of motion and neck supple.   Skin:     General: Skin is warm and dry.      Coloration: Skin is not jaundiced.   Neurological:      General: No focal deficit present.      Mental Status: She is alert and oriented to person, place, and time. Mental status is at baseline.   Psychiatric:         Mood and Affect: Mood normal.         Behavior: Behavior normal.         Judgment: Judgment normal.                Medications in the Emergency Department:  Medications   sodium chloride 0.9 % flush 10 mL (has no administration in time range)   ondansetron (ZOFRAN) injection 4 mg (4 mg Intravenous Given 10/2/22 1747)   ketorolac (TORADOL) injection 15 mg (15 mg Intravenous Given 10/2/22 1812)   oxyCODONE-acetaminophen (PERCOCET) 5-325 MG per tablet 2 tablet (2 tablets Oral Given 10/2/22 1812)        Labs  Lab Results (last 24 hours)     Procedure Component Value Units Date/Time    CBC & Differential [[id number]]  (Abnormal) Collected: 10/02/22 1706    Specimen: Blood Updated: 10/02/22 1713    Narrative:      The following orders were created for panel order CBC & Differential.  Procedure                               Abnormality         Status                     ---------                                -----------         ------                     CBC Auto Differential[[id number]]        Abnormal            Final result                 Please view results for these tests on the individual orders.    Comprehensive Metabolic Panel [[id number]]  (Abnormal) Collected: 10/02/22 1706    Specimen: Blood Updated: 10/02/22 1755     Glucose 141 mg/dL      BUN 10 mg/dL      Creatinine 1.06 mg/dL      Sodium 143 mmol/L      Potassium 4.6 mmol/L      Chloride 107 mmol/L      CO2 22.7 mmol/L      Calcium 9.9 mg/dL      Total Protein 8.2 g/dL      Albumin 4.90 g/dL      ALT (SGPT) 15 U/L      AST (SGOT) 17 U/L      Alkaline Phosphatase 95 U/L      Total Bilirubin 0.6 mg/dL      Globulin 3.3 gm/dL      A/G Ratio 1.5 g/dL      BUN/Creatinine Ratio 9.4     Anion Gap 13.3 mmol/L      eGFR 64.5 mL/min/1.73      Comment: National Kidney Foundation and American Society of Nephrology (ASN) Task Force recommended calculation based on the Chronic Kidney Disease Epidemiology Collaboration (CKD-EPI) equation refit without adjustment for race.       Narrative:      GFR Normal >60  Chronic Kidney Disease <60  Kidney Failure <15      Lipase [[id number]]  (Normal) Collected: 10/02/22 1706    Specimen: Blood Updated: 10/02/22 1741     Lipase 25 U/L     hCG, Quantitative, Pregnancy [[id number]] Collected: 10/02/22 1706    Specimen: Blood Updated: 10/02/22 1732     HCG Quantitative <0.50 mIU/mL     Narrative:      HCG Ranges by Gestational Age    Females - non-pregnant premenopausal   </= 1mIU/mL HCG  Females - postmenopausal               </= 7mIU/mL HCG    3 Weeks       5.4   -      72 mIU/mL  4 Weeks      10.2   -     708 mIU/mL  5 Weeks       217   -   8,245 mIU/mL  6 Weeks       152   -  32,177 mIU/mL  7 Weeks     4,059   - 153,767 mIU/mL  8 Weeks    31,366   - 149,094 mIU/mL  9 Weeks    59,109   - 135,901 mIU/mL  10 Weeks   44,186   - 170,409 mIU/mL  12 Weeks   27,107   - 201,615 mIU/mL  14 Weeks   24,302   -  93,646 mIU/mL  15 Weeks    12,540   -  69,747 mIU/mL  16 Weeks    8,904   -  55,332 mIU/mL  17 Weeks    8,240   -  51,793 mIU/mL  18 Weeks    9,649   -  55,271 mIU/mL    Results may be falsely decreased if patient taking Biotin.      CBC Auto Differential [[id number]]  (Abnormal) Collected: 10/02/22 1706    Specimen: Blood Updated: 10/02/22 1713     WBC 12.25 10*3/mm3      RBC 4.05 10*6/mm3      Hemoglobin 13.3 g/dL      Hematocrit 37.7 %      MCV 93.1 fL      MCH 32.8 pg      MCHC 35.3 g/dL      RDW 13.1 %      RDW-SD 44.8 fl      MPV 9.4 fL      Platelets 378 10*3/mm3      Neutrophil % 83.9 %      Lymphocyte % 11.4 %      Monocyte % 4.0 %      Eosinophil % 0.2 %      Basophil % 0.2 %      Immature Grans % 0.3 %      Neutrophils, Absolute 10.28 10*3/mm3      Lymphocytes, Absolute 1.40 10*3/mm3      Monocytes, Absolute 0.49 10*3/mm3      Eosinophils, Absolute 0.02 10*3/mm3      Basophils, Absolute 0.02 10*3/mm3      Immature Grans, Absolute 0.04 10*3/mm3      nRBC 0.0 /100 WBC     Urinalysis With Microscopic If Indicated (No Culture) - Urine, Clean Catch [[id number]]  (Abnormal) Collected: 10/02/22 1840    Specimen: Urine, Clean Catch Updated: 10/02/22 1913     Color, UA Yellow     Appearance, UA Cloudy     pH, UA 5.5     Specific Gravity, UA 1.024     Glucose, UA Negative     Ketones, UA 80 mg/dL (3+)     Bilirubin, UA Negative     Blood, UA Moderate (2+)     Protein, UA 30 mg/dL (1+)     Leuk Esterase, UA Negative     Nitrite, UA Negative     Urobilinogen, UA 1.0 E.U./dL    Urinalysis, Microscopic Only - Urine, Clean Catch [[id number]]  (Abnormal) Collected: 10/02/22 1840    Specimen: Urine, Clean Catch Updated: 10/02/22 1913     RBC, UA 3-5 /HPF      WBC, UA 0-2 /HPF      Bacteria, UA 2+ /HPF      Squamous Epithelial Cells, UA 3-6 /HPF      Hyaline Casts, UA 0-2 /LPF      Methodology Manual Light Microscopy           Imaging:  CT Abdomen Pelvis Stone Protocol    Result Date: 10/2/2022  PROCEDURE: CT ABDOMEN PELVIS STONE PROTOCOL   COMPARISON: [facility], CT, CT ABDOMEN PELVIS W CONTRAST, 3/24/2022, 12:47.  INDICATIONS: Flank pain, kidney stone suspected  TECHNIQUE: CT images were created without intravenous contrast.   PROTOCOL:   Standard imaging protocol performed    RADIATION:   DLP: 1204.7mGy*cm   Automated exposure control was utilized to minimize radiation dose.  FINDINGS:  No suspicious infiltrate is seen in the lung bases.  No pleural effusion or pericardial effusion.  Heart size appears within normal limits.  Gastric band appears in satisfactory position.  No acute gastric abnormality is identified.  There is mild dilatation of the distal esophagus.  No ectopic bowel gas is seen.  The liver appears mildly hypodense relative to the spleen which may indicate fatty infiltration.  No focal hepatic abnormality is seen on this limited noncontrast study.  Spleen, adrenal glands, pancreas, and bile ducts also appear grossly unremarkable.  Status post cholecystectomy.  There is mild right hydronephrosis and hydroureter.  There is suspicion for a 2-3 mm calculus in the distal right ureter on axial image 198. Several additional pelvic calcifications are suspected to represent phleboliths.  Small calculus is seen in the right renal upper pole.  No left hydronephrosis or significant left renal calculi.  No other suspicious renal abnormality is seen.  The bladder is nondistended limiting assessment.  There is mild atherosclerosis of the aorta.  No definite aortic aneurysm.  No significant abdominal lymphadenopathy.  No acute small bowel abnormality is identified.  The appendix is surgically absent.  There is diverticulosis of the colon.  No definite acute colonic or rectal abnormality is identified.  No pelvic free fluid.  Uterus and adnexa appear grossly unremarkable for patient age.  No pelvic adenopathy is seen.  There are L5 pars defects with anterolisthesis of L5 on S1.  There is advanced disc degeneration at L5-S1.  No  aggressive osseous lesion is seen.         1. Mild right hydronephrosis and hydroureter with 2-3 mm calculus in the distal right ureter. 2. Small right renal calculus. 3. Diverticulosis.  New line mild fatty infiltration of the liver. 4. L5 pars defects with anterolisthesis of L5 on S1.     LI KRAMER MD       Electronically Signed and Approved By: LI KRAMER MD on 10/02/2022 at 17:25               Procedures:  Procedures    Progress  ED Course as of 10/02/22 1930   Sun Oct 02, 2022   1618 --- PROVIDER IN TRIAGE NOTE ---    The patient was evaluated my meNazanin in triage. Orders were placed and the patient is currently awaiting disposition.    [AJ]   1925 Feeling much better on reevaluation and happy to go home.  Strong return warnings given. [RP]      ED Course User Index  [AJ] Nazanin Childers PA-C  [RP] Rancho Patton MD                            The patient was initially evaluated in the triage area where orders were placed. The patient was later dispositioned by Rancho Patton MD.      The patient was advised to stay for completion of workup which includes but is not limited to communication of labs and radiological results, reassessment and plan. The patient was advised that leaving prior to disposition by a provider could result in critical findings that are not communicated to the patient.     Medical Decision Making:  MDM  Number of Diagnoses or Management Options     Amount and/or Complexity of Data Reviewed  Tests in the radiology section of CPT®: reviewed  Decide to obtain previous medical records or to obtain history from someone other than the patient: yes  Independent visualization of images, tracings, or specimens: yes    Risk of Complications, Morbidity, and/or Mortality  Presenting problems: moderate  Management options: low    Patient Progress  Patient progress: improved           The following orders were placed after triage and evaluation:  Orders Placed This  Encounter   Procedures   • CT Abdomen Pelvis Stone Protocol   • [place] Draw   • Comprehensive Metabolic Panel   • Lipase   • Urinalysis With Microscopic If Indicated (No Culture) - Urine, Clean Catch   • hCG, Quantitative, Pregnancy   • CBC Auto Differential   • Urinalysis, Microscopic Only - Urine, Clean Catch   • NPO Diet NPO Type: Strict NPO   • Undress & Gown   • Insert Peripheral IV   • CBC & Differential   • Green Top (Gel)   • Lavender Top   • Gold Top - SST   • Light Blue Top       Final diagnoses:   Right ureteral stone          Disposition:  ED Disposition     ED Disposition   Discharge    Condition   Stable    Comment   --             This medical record created using voice recognition software.           Rancho Patton MD  10/02/22 1930

## 2022-10-02 NOTE — DISCHARGE INSTRUCTIONS
Stay well-hydrated.  Return to the emergency department as we discussed for uncontrolled pain, uncontrolled vomiting, fever.